# Patient Record
Sex: MALE | Race: WHITE | NOT HISPANIC OR LATINO | Employment: OTHER | URBAN - NONMETROPOLITAN AREA
[De-identification: names, ages, dates, MRNs, and addresses within clinical notes are randomized per-mention and may not be internally consistent; named-entity substitution may affect disease eponyms.]

---

## 2017-01-17 ENCOUNTER — OFFICE VISIT (OUTPATIENT)
Dept: GASTROENTEROLOGY | Facility: CLINIC | Age: 72
End: 2017-01-17

## 2017-01-17 VITALS
TEMPERATURE: 98.1 F | RESPIRATION RATE: 18 BRPM | SYSTOLIC BLOOD PRESSURE: 164 MMHG | WEIGHT: 249 LBS | HEIGHT: 70 IN | HEART RATE: 72 BPM | BODY MASS INDEX: 35.65 KG/M2 | DIASTOLIC BLOOD PRESSURE: 81 MMHG

## 2017-01-17 DIAGNOSIS — D64.9 ANEMIA, UNSPECIFIED TYPE: ICD-10-CM

## 2017-01-17 DIAGNOSIS — Z12.11 COLON CANCER SCREENING: ICD-10-CM

## 2017-01-17 DIAGNOSIS — R19.4 CHANGE IN BOWEL HABITS: Primary | ICD-10-CM

## 2017-01-17 DIAGNOSIS — E66.3 OVER WEIGHT: ICD-10-CM

## 2017-01-17 DIAGNOSIS — K59.09 OTHER CONSTIPATION: ICD-10-CM

## 2017-01-17 PROCEDURE — 99203 OFFICE O/P NEW LOW 30 MIN: CPT | Performed by: INTERNAL MEDICINE

## 2017-01-17 RX ORDER — ASPIRIN 81 MG/1
81 TABLET ORAL DAILY
COMMUNITY
End: 2017-01-17 | Stop reason: DRUGHIGH

## 2017-01-17 RX ORDER — ASPIRIN 325 MG
325 TABLET ORAL DAILY
COMMUNITY
End: 2017-02-15 | Stop reason: HOSPADM

## 2017-01-17 RX ORDER — ATORVASTATIN CALCIUM 20 MG/1
TABLET, FILM COATED ORAL
Refills: 1 | COMMUNITY
Start: 2016-10-29 | End: 2022-08-24 | Stop reason: ALTCHOICE

## 2017-01-17 RX ORDER — AMLODIPINE BESYLATE 5 MG/1
TABLET ORAL
Refills: 1 | COMMUNITY
Start: 2016-10-29 | End: 2022-02-01

## 2017-01-17 RX ORDER — LOSARTAN POTASSIUM AND HYDROCHLOROTHIAZIDE 25; 100 MG/1; MG/1
TABLET ORAL
Refills: 1 | COMMUNITY
Start: 2016-11-10 | End: 2018-04-16

## 2017-01-17 RX ORDER — TRAMADOL HYDROCHLORIDE 50 MG/1
TABLET ORAL
Refills: 2 | COMMUNITY
Start: 2016-12-30 | End: 2018-10-23

## 2017-01-17 NOTE — MR AVS SNAPSHOT
Trevor Jeffers   1/17/2017 1:30 PM   Office Visit    Dept Phone:  [phone]   Encounter #:  [id number]    Provider:  Jovany Monson MD   Department:  [facility] GASTROENTEROLOGY                Your Full Care Plan              Today's Medication Changes          These changes are accurate as of: 1/17/17  2:28 PM.  If you have any questions, ask your nurse or doctor.               Medication(s)that have changed:     aspirin 325 MG tablet   Take 325 mg by mouth Daily.   What changed:  Another medication with the same name was removed. Continue taking this medication, and follow the directions you see here.   Changed by:  Jovany Monson MD                  Your Updated Medication List          This list is accurate as of: 1/17/17  2:28 PM.  Always use your most recent med list.                amLODIPine 5 MG tablet   Commonly known as:  NORVASC       aspirin 325 MG tablet       atorvastatin 20 MG tablet   Commonly known as:  LIPITOR       losartan-hydrochlorothiazide 100-25 MG per tablet   Commonly known as:  HYZAAR       traMADol 50 MG tablet   Commonly known as:  ULTRAM               Instructions    1.  Low-fat diet.  2.  Weight reduction.  3.  Colonoscopy: Description of the procedure, risks benefits alternatives and options including non-operative options were discussed with the patient in detail.  The patient understands and wishes to proceed.       Patient Instructions History      Upcoming Appointments     Visit Type Date Time Department    NEW PATIENT 1/17/2017  1:30 PM [facility]      Dobango Signup     [facility] Dobango allows you to send messages to your doctor, view your test results, renew your prescriptions, schedule appointments, and more. To sign up, go to [employer] and click on the Sign Up Now link in the New User? box. Enter your Dobango Activation Code exactly as it appears below along with the last four digits of your  "Social Security Number and your Date of Birth () to complete the sign-up process. If you do not sign up before the expiration date, you must request a new code.    Badge Activation Code: [email or web address]  Expires: 2017  2:28 PM    If you have questions, you can email [email or web address] or call [phone] to talk to our Badge staff. Remember, Badge is NOT to be used for urgent needs. For medical emergencies, dial 911.               Other Info from Your Visit           Allergies     No Known Allergies      Reason for Visit     Constipation           Vital Signs     Blood Pressure Pulse Temperature Respirations Height Weight    164/81 72 98.1 °F (36.7 °C) 18 70\" (177.8 cm) 249 lb (113 kg)    Body Mass Index Smoking Status                35.73 kg/m2 Never Smoker            "

## 2017-01-17 NOTE — PROGRESS NOTES
[address] [facility] [address]    (H) [phone]  (W)     Chief Complaint   Patient presents with   • Constipation       History of Present Illness  The patient has a history of constipation off and on for the last one year or so.  This is described as new-onset change.  The patient used to be rather irregular in the past.  Severity of constipation is mild to moderate.  This is described as hard stools perhaps one bowel movement 2-3 times a week. The patient takes stool softeners at times to have a bowel movement. There is no associated rectal pain.    There is history of mild anemia noted on routine blood tests.There is no history of overt GI bleed (Hematemesis, melena or hematochezia).  The patient denies nosebleeds.  There is no prior history of anemia.  He denies nausea or vomiting.  There is no reflux.  He denies dysphagia or odynophagia.  There is no history of diarrhea.  There is no family history of colon cancer.  Of interest the patient had undergone a colonoscopy in 2008 in [place].  The patient is not sure if he had some polyps.     Review of Systems   Constitutional: Negative for appetite change, chills, fatigue, fever and unexpected weight change.   HENT: Negative for mouth sores, nosebleeds and trouble swallowing.    Eyes: Negative for discharge and redness.   Respiratory: Negative for apnea, cough and shortness of breath.    Cardiovascular: Negative for chest pain, palpitations and leg swelling.   Gastrointestinal: Positive for constipation. Negative for abdominal distention, abdominal pain, anal bleeding, blood in stool, diarrhea, nausea and vomiting.   Endocrine: Negative for cold intolerance, heat intolerance and polydipsia.   Genitourinary: Negative for dysuria, hematuria and urgency.   Musculoskeletal: Positive for arthralgias. Negative for joint swelling and myalgias.   Skin: Negative for rash.   Allergic/Immunologic: Negative for food allergies and immunocompromised state.  "  Neurological: Negative for dizziness, seizures, syncope and headaches.   Hematological: Negative for adenopathy. Does not bruise/bleed easily.   Psychiatric/Behavioral: Negative for dysphoric mood. The patient is not nervous/anxious and is not hyperactive.      Patient Active Problem List   Diagnosis   • VHD (valvular heart disease)   • Hypertension   • Dyslipidemia   • Obesity   • Degenerative arthritis   • Lower extremity edema     Past Medical History   Diagnosis Date   • Anemia    • Chronic pain    • Degenerative arthritis    • Diabetes    • Dyslipidemia    • Hyperlipidemia    • Hypertension      Long Standing    • Lower extremity edema      Mild, which is self limiting.    • Obesity      s/p lap band surg    • Osteoarthritis    • VHD (valvular heart disease)      Past Surgical History   Procedure Laterality Date   • Laparoscopic gastric banding     • Colonoscopy       Family History   Problem Relation Age of Onset   • Colon cancer Neg Hx      Social History   Substance Use Topics   • Smoking status: Never Smoker   • Smokeless tobacco: Never Used   • Alcohol use No       Current Outpatient Prescriptions:   •  amLODIPine (NORVASC) 5 MG tablet, TAKE 1 TABLET EVERY DAY, Disp: , Rfl: 1  •  aspirin 325 MG tablet, Take 325 mg by mouth Daily., Disp: , Rfl:   •  atorvastatin (LIPITOR) 20 MG tablet, TAKE 1 TABLET EVERY DAY, Disp: , Rfl: 1  •  losartan-hydrochlorothiazide (HYZAAR) 100-25 MG per tablet, TAKE 1 TABLET BY MOUTH EVERY DAY, Disp: , Rfl: 1  •  traMADol (ULTRAM) 50 MG tablet, TAKE ONE TABLET BY MOUTH EVERY 6 HOURS AS NEEDED, Disp: , Rfl: 2  No Known Allergies  Visit Vitals   • /81   • Pulse 72   • Temp 98.1 °F (36.7 °C)   • Resp 18   • Ht 70\" (177.8 cm)   • Wt 249 lb (113 kg)   • BMI 35.73 kg/m2     Physical Exam   Constitutional: He is oriented to person, place, and time. He appears well-developed and well-nourished. No distress.   HENT:   Head: Normocephalic and atraumatic.   Right Ear: Hearing and " external ear normal.   Left Ear: Hearing and external ear normal.   Mouth/Throat: Mucous membranes are normal. No oral lesions. No oropharyngeal exudate, posterior oropharyngeal edema or posterior oropharyngeal erythema.   Eyes: Conjunctivae and EOM are normal. Right eye exhibits no discharge. Left eye exhibits no discharge.   Neck: Trachea normal. Neck supple. No JVD present. No edema present. No thyroid mass present.   Cardiovascular: Normal rate, regular rhythm, S1 normal and S2 normal.  Exam reveals no gallop and no friction rub.    No murmur heard.  Pulmonary/Chest: Effort normal and breath sounds normal. No respiratory distress. He exhibits no tenderness.   Abdominal: Normal appearance and bowel sounds are normal. He exhibits no ascites and no mass. There is no splenomegaly or hepatomegaly. There is no tenderness. There is negative Smalls's sign. No hernia.   Musculoskeletal:        Right shoulder: He exhibits no swelling and no deformity.   Lymphadenopathy:     He has no cervical adenopathy.   Neurological: He is alert and oriented to person, place, and time. He has normal strength. No cranial nerve deficit or sensory deficit.   Skin: No rash noted. No cyanosis. Nails show no clubbing.   Psychiatric: He has a normal mood and affect. His behavior is normal. Judgment and thought content normal. Cognition and memory are normal.     Laboratory Tests:    Upon review of medical records:    Dated November 3, 2016 Sodium 142 potassium 3.5 chloride 105 CO2 30 BUN 18 serum creatinine 0.9 glucose 136.  Calcium 8.9.  Albumin 3.4 T bili 0.50 AST 19 ALT 29 alkaline phosphatase 83. Total cholesterol 166.  Triglycerides 106.  TSH 0.90.  T3 uptake 35.  T4 (thyroxine) 13.4.  WBC 6.67.  Hemoglobin 13.5 hematocrit 39.4 MCV 89.1 Platelet count 239.       Assessment and Plan:      Trevor was seen today for constipation.    Diagnoses and all orders for this visit:    Change in bowel habits  Comments:  Concerns regarding underlying  colonic neoplastic disease.    Other constipation  Comments:  A possibility of obstructive lesion remains.    Anemia, unspecified type  Comments:  Mild anemia.  Possibly related to underlying chronic neoplastic disease.    Colon cancer screening  Comments:  For colon cancer screening.    Over weight  Comments:  The patient is rather overweight.          Discussion:       Plan     Patient Instructions     1.  Low-fat consistent carbohydrate diet.  2.  Weight reduction.  3.  Colonoscopy: Description of the procedure, risks benefits alternatives and options including non-operative options were discussed with the patient in detail.  The patient understands and wishes to proceed.      Patient Care Team:  Brian Del Angel MD as PCP - General    Jovany Monson MD

## 2017-01-17 NOTE — PATIENT INSTRUCTIONS
1.  Low-fat consistent carbohydrate diet.  2.  Weight reduction.  3.  Colonoscopy: Description of the procedure, risks benefits alternatives and options including non-operative options were discussed with the patient in detail.  The patient understands and wishes to proceed.

## 2017-01-17 NOTE — LETTER
January 17, 2017     Brian Del Angel MD  [address]  [place] [address]    Patient: Trevor Jeffers   YOB: 1945   Date of Visit: 1/17/2017       Dear Dr. Bean MD:    Thank you for referring Trevor Jeffers to me for evaluation. Below are the relevant portions of my assessment and plan of care.    If you have questions, please do not hesitate to call me. I look forward to following Trevor along with you.         Sincerely,        Jovany Monson MD        CC: No Recipients  Jovany Monson MD  1/17/2017  5:57 PM  Signed  [address] [facility] [place] [address]    (H) [phone]  (W)     Chief Complaint   Patient presents with   • Constipation       History of Present Illness  The patient has a history of constipation off and on for the last one year or so.  This is described as new-onset change.  The patient used to be rather irregular in the past.  Severity of constipation is mild to moderate.  This is described as hard stools perhaps one bowel movement 2-3 times a week. The patient takes stool softeners at times to have a bowel movement. There is no associated rectal pain.    There is history of mild anemia noted on routine blood tests.There is no history of overt GI bleed (Hematemesis, melena or hematochezia).  The patient denies nosebleeds.  There is no prior history of anemia.  He denies nausea or vomiting.  There is no reflux.  He denies dysphagia or odynophagia.  There is no history of diarrhea.  There is no family history of colon cancer.  Of interest the patient had undergone a colonoscopy in 2008 in [place].  The patient is not sure if he had some polyps.     Review of Systems   Constitutional: Negative for appetite change, chills, fatigue, fever and unexpected weight change.   HENT: Negative for mouth sores, nosebleeds and trouble swallowing.    Eyes: Negative for discharge and redness.   Respiratory: Negative for apnea, cough and shortness of breath.       Cardiovascular: Negative for chest pain, palpitations and leg swelling.   Gastrointestinal: Positive for constipation. Negative for abdominal distention, abdominal pain, anal bleeding, blood in stool, diarrhea, nausea and vomiting.   Endocrine: Negative for cold intolerance, heat intolerance and polydipsia.   Genitourinary: Negative for dysuria, hematuria and urgency.   Musculoskeletal: Positive for arthralgias. Negative for joint swelling and myalgias.   Skin: Negative for rash.   Allergic/Immunologic: Negative for food allergies and immunocompromised state.   Neurological: Negative for dizziness, seizures, syncope and headaches.   Hematological: Negative for adenopathy. Does not bruise/bleed easily.   Psychiatric/Behavioral: Negative for dysphoric mood. The patient is not nervous/anxious and is not hyperactive.      Patient Active Problem List   Diagnosis   • VHD (valvular heart disease)   • Hypertension   • Dyslipidemia   • Obesity   • Degenerative arthritis   • Lower extremity edema     Past Medical History   Diagnosis Date   • Anemia    • Chronic pain    • Degenerative arthritis    • Diabetes    • Dyslipidemia    • Hyperlipidemia    • Hypertension      Long Standing    • Lower extremity edema      Mild, which is self limiting.    • Obesity      s/p lap band surg    • Osteoarthritis    • VHD (valvular heart disease)      Past Surgical History   Procedure Laterality Date   • Laparoscopic gastric banding     • Colonoscopy       Family History   Problem Relation Age of Onset   • Colon cancer Neg Hx      Social History   Substance Use Topics   • Smoking status: Never Smoker   • Smokeless tobacco: Never Used   • Alcohol use No       Current Outpatient Prescriptions:   •  amLODIPine (NORVASC) 5 MG tablet, TAKE 1 TABLET EVERY DAY, Disp: , Rfl: 1  •  aspirin 325 MG tablet, Take 325 mg by mouth Daily., Disp: , Rfl:   •  atorvastatin (LIPITOR) 20 MG tablet, TAKE 1 TABLET EVERY DAY, Disp: , Rfl: 1  •   "losartan-hydrochlorothiazide (HYZAAR) 100-25 MG per tablet, TAKE 1 TABLET BY MOUTH EVERY DAY, Disp: , Rfl: 1  •  traMADol (ULTRAM) 50 MG tablet, TAKE ONE TABLET BY MOUTH EVERY 6 HOURS AS NEEDED, Disp: , Rfl: 2  No Known Allergies  Visit Vitals   • /81   • Pulse 72   • Temp 98.1 °F (36.7 °C)   • Resp 18   • Ht 70\" (177.8 cm)   • Wt 249 lb (113 kg)   • BMI 35.73 kg/m2     Physical Exam   Constitutional: He is oriented to person, place, and time. He appears well-developed and well-nourished. No distress.   HENT:   Head: Normocephalic and atraumatic.   Right Ear: Hearing and external ear normal.   Left Ear: Hearing and external ear normal.   Mouth/Throat: Mucous membranes are normal. No oral lesions. No oropharyngeal exudate, posterior oropharyngeal edema or posterior oropharyngeal erythema.   Eyes: Conjunctivae and EOM are normal. Right eye exhibits no discharge. Left eye exhibits no discharge.   Neck: Trachea normal. Neck supple. No JVD present. No edema present. No thyroid mass present.   Cardiovascular: Normal rate, regular rhythm, S1 normal and S2 normal.  Exam reveals no gallop and no friction rub.    No murmur heard.  Pulmonary/Chest: Effort normal and breath sounds normal. No respiratory distress. He exhibits no tenderness.   Abdominal: Normal appearance and bowel sounds are normal. He exhibits no ascites and no mass. There is no splenomegaly or hepatomegaly. There is no tenderness. There is negative Smalls's sign. No hernia.   Musculoskeletal:        Right shoulder: He exhibits no swelling and no deformity.   Lymphadenopathy:     He has no cervical adenopathy.   Neurological: He is alert and oriented to person, place, and time. He has normal strength. No cranial nerve deficit or sensory deficit.   Skin: No rash noted. No cyanosis. Nails show no clubbing.   Psychiatric: He has a normal mood and affect. His behavior is normal. Judgment and thought content normal. Cognition and memory are normal. "     Laboratory Tests:    Upon review of medical records:    Dated November 3, 2016 Sodium 142 potassium 3.5 chloride 105 CO2 30 BUN 18 serum creatinine 0.9 glucose 136.  Calcium 8.9.  Albumin 3.4 T bili 0.50 AST 19 ALT 29 alkaline phosphatase 83. Total cholesterol 166.  Triglycerides 106.  TSH 0.90.  T3 uptake 35.  T4 (thyroxine) 13.4.  WBC 6.67.  Hemoglobin 13.5 hematocrit 39.4 MCV 89.1 Platelet count 239.       Assessment and Plan:      Trevor was seen today for constipation.    Diagnoses and all orders for this visit:    Change in bowel habits  Comments:  Concerns regarding underlying colonic neoplastic disease.    Other constipation  Comments:  A possibility of obstructive lesion remains.    Anemia, unspecified type  Comments:  Mild anemia.  Possibly related to underlying chronic neoplastic disease.    Colon cancer screening  Comments:  For colon cancer screening.    Over weight  Comments:  The patient is rather overweight.          Discussion:       Plan     Patient Instructions     1.  Low-fat consistent carbohydrate diet.  2.  Weight reduction.  3.  Colonoscopy: Description of the procedure, risks benefits alternatives and options including non-operative options were discussed with the patient in detail.  The patient understands and wishes to proceed.      Patient Care Team:  Brian Del Angel MD as PCP - General    Jovany Monson MD

## 2017-02-14 ENCOUNTER — PREP FOR SURGERY (OUTPATIENT)
Dept: GASTROENTEROLOGY | Facility: CLINIC | Age: 72
End: 2017-02-14

## 2017-02-14 DIAGNOSIS — R19.4 CHANGE IN BOWEL HABITS: Primary | ICD-10-CM

## 2017-02-14 DIAGNOSIS — Z12.11 COLON CANCER SCREENING: ICD-10-CM

## 2017-02-14 DIAGNOSIS — D64.9 ANEMIA, UNSPECIFIED: ICD-10-CM

## 2017-02-14 DIAGNOSIS — K59.09 OTHER CONSTIPATION: ICD-10-CM

## 2017-02-14 RX ORDER — SODIUM CHLORIDE 9 MG/ML
70 INJECTION, SOLUTION INTRAVENOUS CONTINUOUS PRN
Status: CANCELLED | OUTPATIENT
Start: 2017-02-15

## 2017-02-14 RX ORDER — SODIUM CHLORIDE 0.9 % (FLUSH) 0.9 %
1-10 SYRINGE (ML) INJECTION AS NEEDED
Status: CANCELLED | OUTPATIENT
Start: 2017-02-15

## 2017-02-15 ENCOUNTER — ANESTHESIA EVENT (OUTPATIENT)
Dept: GASTROENTEROLOGY | Facility: HOSPITAL | Age: 72
End: 2017-02-15

## 2017-02-15 ENCOUNTER — HOSPITAL ENCOUNTER (OUTPATIENT)
Facility: HOSPITAL | Age: 72
Setting detail: HOSPITAL OUTPATIENT SURGERY
Discharge: HOME | End: 2017-02-15
Attending: INTERNAL MEDICINE | Admitting: INTERNAL MEDICINE

## 2017-02-15 ENCOUNTER — ANESTHESIA (OUTPATIENT)
Dept: GASTROENTEROLOGY | Facility: HOSPITAL | Age: 72
End: 2017-02-15

## 2017-02-15 VITALS
TEMPERATURE: 97.4 F | BODY MASS INDEX: 33.6 KG/M2 | HEIGHT: 71 IN | HEART RATE: 68 BPM | WEIGHT: 240 LBS | RESPIRATION RATE: 16 BRPM | DIASTOLIC BLOOD PRESSURE: 76 MMHG | OXYGEN SATURATION: 97 % | SYSTOLIC BLOOD PRESSURE: 131 MMHG

## 2017-02-15 DIAGNOSIS — D64.9 ANEMIA: ICD-10-CM

## 2017-02-15 DIAGNOSIS — K59.09 OTHER CONSTIPATION: ICD-10-CM

## 2017-02-15 DIAGNOSIS — Z12.11 SCREENING FOR COLON CANCER: ICD-10-CM

## 2017-02-15 DIAGNOSIS — D64.9 ANEMIA, UNSPECIFIED: ICD-10-CM

## 2017-02-15 DIAGNOSIS — Z12.11 COLON CANCER SCREENING: ICD-10-CM

## 2017-02-15 DIAGNOSIS — R19.4 CHANGE IN BOWEL HABITS: ICD-10-CM

## 2017-02-15 DIAGNOSIS — K59.00 CONSTIPATION: ICD-10-CM

## 2017-02-15 LAB — GLUCOSE BLDC GLUCOMTR-MCNC: 146 MG/DL (ref 70–130)

## 2017-02-15 PROCEDURE — 45380 COLONOSCOPY AND BIOPSY: CPT | Performed by: INTERNAL MEDICINE

## 2017-02-15 PROCEDURE — 88305 TISSUE EXAM BY PATHOLOGIST: CPT | Performed by: INTERNAL MEDICINE

## 2017-02-15 PROCEDURE — 82962 GLUCOSE BLOOD TEST: CPT

## 2017-02-15 PROCEDURE — 45385 COLONOSCOPY W/LESION REMOVAL: CPT | Performed by: INTERNAL MEDICINE

## 2017-02-15 PROCEDURE — 25010000002 PROPOFOL 1000 MG/100ML EMULSION: Performed by: NURSE ANESTHETIST, CERTIFIED REGISTERED

## 2017-02-15 PROCEDURE — 25010000002 ONDANSETRON PER 1 MG: Performed by: NURSE ANESTHETIST, CERTIFIED REGISTERED

## 2017-02-15 RX ORDER — ONDANSETRON 2 MG/ML
INJECTION INTRAMUSCULAR; INTRAVENOUS AS NEEDED
Status: DISCONTINUED | OUTPATIENT
Start: 2017-02-15 | End: 2017-02-15 | Stop reason: SURG

## 2017-02-15 RX ORDER — LIDOCAINE 50 MG/G
OINTMENT TOPICAL AS NEEDED
Status: DISCONTINUED | OUTPATIENT
Start: 2017-02-15 | End: 2017-02-15 | Stop reason: HOSPADM

## 2017-02-15 RX ORDER — PROPOFOL 10 MG/ML
INJECTION, EMULSION INTRAVENOUS AS NEEDED
Status: DISCONTINUED | OUTPATIENT
Start: 2017-02-15 | End: 2017-02-15 | Stop reason: SURG

## 2017-02-15 RX ORDER — SODIUM CHLORIDE 0.9 % (FLUSH) 0.9 %
1-10 SYRINGE (ML) INJECTION AS NEEDED
Status: DISCONTINUED | OUTPATIENT
Start: 2017-02-15 | End: 2017-02-15 | Stop reason: HOSPADM

## 2017-02-15 RX ORDER — SODIUM CHLORIDE 9 MG/ML
70 INJECTION, SOLUTION INTRAVENOUS CONTINUOUS PRN
Status: DISCONTINUED | OUTPATIENT
Start: 2017-02-15 | End: 2017-02-15 | Stop reason: HOSPADM

## 2017-02-15 RX ADMIN — SODIUM CHLORIDE 70 ML/HR: 9 INJECTION, SOLUTION INTRAVENOUS at 10:10

## 2017-02-15 RX ADMIN — LIDOCAINE HYDROCHLORIDE 60 MG: 20 INJECTION, SOLUTION INTRAVENOUS at 12:43

## 2017-02-15 RX ADMIN — ONDANSETRON 4 MG: 2 INJECTION INTRAMUSCULAR; INTRAVENOUS at 12:43

## 2017-02-15 RX ADMIN — PROPOFOL 50 MG: 10 INJECTION, EMULSION INTRAVENOUS at 12:57

## 2017-02-15 RX ADMIN — PROPOFOL 100 MG: 10 INJECTION, EMULSION INTRAVENOUS at 12:43

## 2017-02-15 RX ADMIN — PROPOFOL 50 MG: 10 INJECTION, EMULSION INTRAVENOUS at 13:04

## 2017-02-15 RX ADMIN — PROPOFOL 50 MG: 10 INJECTION, EMULSION INTRAVENOUS at 12:50

## 2017-02-15 NOTE — ANESTHESIA POSTPROCEDURE EVALUATION
Patient: Trevor Jeffers    Procedure Summary     Date Anesthesia Start Anesthesia Stop Room / Location    02/15/17 1234 1315 [facility] ENDOSCOPY 2 / [facility] ENDOSCOPY       Procedure Diagnosis Surgeon Provider    COLONOSCOPY (N/A Anus) Diverticulosis of colon; Colon polyps; Internal hemorrhoids MD Jesusita Donovan CRNA          Anesthesia Type: MAC  Last vitals  BP  108/64   Temp   98 skin   Pulse  78   Resp   16   SpO2   98% RA     Post Anesthesia Care and Evaluation    Patient location during evaluation: PHASE II  Patient participation: complete - patient participated  Level of consciousness: awake  Pain score: 0  Pain management: adequate  Airway patency: patent  Anesthetic complications: No anesthetic complications  PONV Status: none  Cardiovascular status: acceptable  Respiratory status: acceptable  Hydration status: acceptable    Comments: Pt alert and oriented, to [facility] for phase 2 recovery on room air

## 2017-02-15 NOTE — ANESTHESIA PREPROCEDURE EVALUATION
Anesthesia Evaluation     no history of anesthetic complications:  NPO Status: > 6 hours   Airway   Mallampati: II  TM distance: <3 FB  possible difficult intubation  Dental      Pulmonary    (+) shortness of breath,   Cardiovascular     (+) hypertension poorly controlled, valvular problems/murmurs ( mumur per pt, unsure of valve disease asymptomatic), SCHWARTZ, murmur, PVD ( le edema),       Neuro/Psych  GI/Hepatic/Renal/Endo    (+) obesity, morbid obesity ( s/p lap band), diabetes mellitus ( fbs 1146) type 2 well controlled,     Musculoskeletal     (+) arthralgias, chronic pain, myalgias,   Abdominal    Substance History      OB/GYN          Other   (+) arthritis     ROS/Med Hx Other: fbs 146                              Anesthesia Plan    ASA 3     MAC     Anesthetic plan and risks discussed with patient.

## 2017-02-15 NOTE — DISCHARGE INSTRUCTIONS
Diet:     Low Fat Diet.     Take fiber supplement.  · Fiber One Cereal-40% Nutty Clusters 1/4 cup daily   · Elvira's All Bran-Bran Buds 1/4 cup daily.  · Use skim, 1, 2 % or soy milk.     Drink 5-6 glasses of water daily.    Blood Thinner Directions:    ·  Avoid Aspirin & other NSAIDS for _7__ days.  Tylenol is okay.    Treatments:    · If constipation persists may use:  · Masterson magnesium caplet (Over-the-counter).  Take one orally at night.  · Cortizone 10- OINTMENT (Hydrocortisone 1% Ointment) Over the counter:   Apply ano-rectally & inside rectum 3 times daily for 1 week. Avoid GEL or Cream.      Other Instructions:    To assist you in voiding:  Drink plenty of fluids  Listen to running water while attempting to void.    If you are unable to urinate and you have an uncomfortable urge to void or it has been   6 hours since you were discharged, return to the Emergency Room    Rest today  No pushing,pulling,tugging,heavy lifting, or strenuous activity   No major decision making,driving,or drinking alcoholic beverages for 24 hours due to the sedation you received  Always use good hand hygiene/washing technique  No driving on pain medications    Call [facility] at [phone] or come to the Emergency   Department if you experience the following: Chest pain, abdominal pain, bleeding  (vomiting of blood or coffee colored material, black stools or mil blood in stools),   fever/chills, nausea and vomiting or dizziness.  Other Instructions:

## 2017-02-15 NOTE — H&P (VIEW-ONLY)
[address] [facility] [address]    (H) [phone]  (W)     Chief Complaint   Patient presents with   • Constipation       History of Present Illness  The patient has a history of constipation off and on for the last one year or so.  This is described as new-onset change.  The patient used to be rather irregular in the past.  Severity of constipation is mild to moderate.  This is described as hard stools perhaps one bowel movement 2-3 times a week. The patient takes stool softeners at times to have a bowel movement. There is no associated rectal pain.    There is history of mild anemia noted on routine blood tests.There is no history of overt GI bleed (Hematemesis, melena or hematochezia).  The patient denies nosebleeds.  There is no prior history of anemia.  He denies nausea or vomiting.  There is no reflux.  He denies dysphagia or odynophagia.  There is no history of diarrhea.  There is no family history of colon cancer.  Of interest the patient had undergone a colonoscopy in 2008 in [place].  The patient is not sure if he had some polyps.     Review of Systems   Constitutional: Negative for appetite change, chills, fatigue, fever and unexpected weight change.   HENT: Negative for mouth sores, nosebleeds and trouble swallowing.    Eyes: Negative for discharge and redness.   Respiratory: Negative for apnea, cough and shortness of breath.    Cardiovascular: Negative for chest pain, palpitations and leg swelling.   Gastrointestinal: Positive for constipation. Negative for abdominal distention, abdominal pain, anal bleeding, blood in stool, diarrhea, nausea and vomiting.   Endocrine: Negative for cold intolerance, heat intolerance and polydipsia.   Genitourinary: Negative for dysuria, hematuria and urgency.   Musculoskeletal: Positive for arthralgias. Negative for joint swelling and myalgias.   Skin: Negative for rash.   Allergic/Immunologic: Negative for food allergies and immunocompromised state.  "  Neurological: Negative for dizziness, seizures, syncope and headaches.   Hematological: Negative for adenopathy. Does not bruise/bleed easily.   Psychiatric/Behavioral: Negative for dysphoric mood. The patient is not nervous/anxious and is not hyperactive.      Patient Active Problem List   Diagnosis   • VHD (valvular heart disease)   • Hypertension   • Dyslipidemia   • Obesity   • Degenerative arthritis   • Lower extremity edema     Past Medical History   Diagnosis Date   • Anemia    • Chronic pain    • Degenerative arthritis    • Diabetes    • Dyslipidemia    • Hyperlipidemia    • Hypertension      Long Standing    • Lower extremity edema      Mild, which is self limiting.    • Obesity      s/p lap band surg    • Osteoarthritis    • VHD (valvular heart disease)      Past Surgical History   Procedure Laterality Date   • Laparoscopic gastric banding     • Colonoscopy       Family History   Problem Relation Age of Onset   • Colon cancer Neg Hx      Social History   Substance Use Topics   • Smoking status: Never Smoker   • Smokeless tobacco: Never Used   • Alcohol use No       Current Outpatient Prescriptions:   •  amLODIPine (NORVASC) 5 MG tablet, TAKE 1 TABLET EVERY DAY, Disp: , Rfl: 1  •  aspirin 325 MG tablet, Take 325 mg by mouth Daily., Disp: , Rfl:   •  atorvastatin (LIPITOR) 20 MG tablet, TAKE 1 TABLET EVERY DAY, Disp: , Rfl: 1  •  losartan-hydrochlorothiazide (HYZAAR) 100-25 MG per tablet, TAKE 1 TABLET BY MOUTH EVERY DAY, Disp: , Rfl: 1  •  traMADol (ULTRAM) 50 MG tablet, TAKE ONE TABLET BY MOUTH EVERY 6 HOURS AS NEEDED, Disp: , Rfl: 2  No Known Allergies  Visit Vitals   • /81   • Pulse 72   • Temp 98.1 °F (36.7 °C)   • Resp 18   • Ht 70\" (177.8 cm)   • Wt 249 lb (113 kg)   • BMI 35.73 kg/m2     Physical Exam   Constitutional: He is oriented to person, place, and time. He appears well-developed and well-nourished. No distress.   HENT:   Head: Normocephalic and atraumatic.   Right Ear: Hearing and " external ear normal.   Left Ear: Hearing and external ear normal.   Mouth/Throat: Mucous membranes are normal. No oral lesions. No oropharyngeal exudate, posterior oropharyngeal edema or posterior oropharyngeal erythema.   Eyes: Conjunctivae and EOM are normal. Right eye exhibits no discharge. Left eye exhibits no discharge.   Neck: Trachea normal. Neck supple. No JVD present. No edema present. No thyroid mass present.   Cardiovascular: Normal rate, regular rhythm, S1 normal and S2 normal.  Exam reveals no gallop and no friction rub.    No murmur heard.  Pulmonary/Chest: Effort normal and breath sounds normal. No respiratory distress. He exhibits no tenderness.   Abdominal: Normal appearance and bowel sounds are normal. He exhibits no ascites and no mass. There is no splenomegaly or hepatomegaly. There is no tenderness. There is negative Smalls's sign. No hernia.   Musculoskeletal:        Right shoulder: He exhibits no swelling and no deformity.   Lymphadenopathy:     He has no cervical adenopathy.   Neurological: He is alert and oriented to person, place, and time. He has normal strength. No cranial nerve deficit or sensory deficit.   Skin: No rash noted. No cyanosis. Nails show no clubbing.   Psychiatric: He has a normal mood and affect. His behavior is normal. Judgment and thought content normal. Cognition and memory are normal.     Laboratory Tests:    Upon review of medical records:    Dated November 3, 2016 Sodium 142 potassium 3.5 chloride 105 CO2 30 BUN 18 serum creatinine 0.9 glucose 136.  Calcium 8.9.  Albumin 3.4 T bili 0.50 AST 19 ALT 29 alkaline phosphatase 83. Total cholesterol 166.  Triglycerides 106.  TSH 0.90.  T3 uptake 35.  T4 (thyroxine) 13.4.  WBC 6.67.  Hemoglobin 13.5 hematocrit 39.4 MCV 89.1 Platelet count 239.       Assessment and Plan:      Trevor was seen today for constipation.    Diagnoses and all orders for this visit:    Change in bowel habits  Comments:  Concerns regarding underlying  colonic neoplastic disease.    Other constipation  Comments:  A possibility of obstructive lesion remains.    Anemia, unspecified type  Comments:  Mild anemia.  Possibly related to underlying chronic neoplastic disease.    Colon cancer screening  Comments:  For colon cancer screening.    Over weight  Comments:  The patient is rather overweight.          Discussion:       Plan     Patient Instructions     1.  Low-fat consistent carbohydrate diet.  2.  Weight reduction.  3.  Colonoscopy: Description of the procedure, risks benefits alternatives and options including non-operative options were discussed with the patient in detail.  The patient understands and wishes to proceed.      Patient Care Team:  Brian Del Angel MD as PCP - General    Jovany Monson MD

## 2017-02-15 NOTE — PLAN OF CARE
Problem: GI Endoscopy (Adult)  Goal: Signs and Symptoms of Listed Potential Problems Will be Absent or Manageable (GI Endoscopy)  Outcome: Ongoing (interventions implemented as appropriate)    02/15/17 1003   GI Endoscopy   Problems Assessed (GI Endoscopy) all   Problems Present (GI Endoscopy) none

## 2017-02-15 NOTE — OP NOTE
PROCEDURE:  Colonoscopy to the terminal ileum with one cold snare and one cold biopsy polypectomies.     DATE OF PROCEDURE: February 15, 2017.     REFERRING PROVIDER:  Brian Del Angel M.D.     INSTRUMENT USED:  Olympus PCF H 190 L videocolonoscope.     INDICATIONS OF THE PROCEDURE:   This is a 71-year-old white male for colon cancer screening.  There is history of constipation, and mild anemia.  Currently undergoing colonoscopy for further evaluation.     BIOPSIES:  1 cold snare polypectomy was performed in the transverse colon, and one cold biopsy polypectomy was performed in the descending colon.      PREPARATION:  [place] prep score: 9 (3+3+3).     PHOTOGRAPHS:  Photographs were included in the medical records.     MEDICATIONS:  MAC.       CONSENT/PREPROCEDURE EVALUATION:  Risks, benefits, alternatives and options of the procedure including risks of sedation/anesthesia were discussed with the patient and informed consent was obtained prior to the procedure.  History and physical examination were performed and nothing precluded the test.      REPORT:  The patient was placed in left lateral decubitus position and a digital examination was performed.  Once under the influence of IV sedation, the instrument was inserted into the rectum and advanced under direct vision to cecum which was identified by the ileocecal valve, triradiate folds and appendiceal orifice. The scope was then maneuvered into the terminal ileum.        FINDINGS:      Digital rectal examination:  Good anal tone.  No perianal pathology.  No mass.  Small external hemorrhoids were noted.      Terminal ileum:  4-5 cm. Normal.        Cecum and ascending colon: Normal.       Hepatic flexure, transverse colon, splenic flexure: A 5 mm sessile polyp was noted in the transverse colon.  This was removed with cold snare.  An occasional diverticulum was noted in the transverse colon.     Descending colon, sigmoid colon and rectum: Scattered diverticulosis was  seen in the left colon.  A 3 mm sessile polyp was noted in the descending colon.  This was removed with cold biopsy forceps.  A retroflex examination within the rectum revealed internal hemorrhoids.        The scope was then straightened, the lower GI tract was decompressed, and the scope was pulled out of the patient.  The patient tolerated the procedure well.  There were no immediate complications and the patient was transferred in stable condition for post procedure observation.       TECHNICAL DATA: [place] prep score: 9 (3+3+3).    Difficulty of examination:  Average.   Withdrawal time: 8 min.  Retroflex examination in right colon: Yes.  Second look Rectum to cecum with decompression: Yes.    DIAGNOSES:    1. Diverticulosis involving the left colon with an occasional diverticulum within the transverse colon.  2. Colon polyps.  3. Internal hemorrhoids, and small external hemorrhoids.  4. This test does not explain the cause of anemia.      RECOMMENDATIONS:     1.  Follow biopsies.    2.  Follow-up: 3-4 weeks.     3.  Followup colonoscopy in 5 years.     4.  Dietary instructions.  5.  Hemorrhoidal care.     Thank you very much for letting me participate in the care of this patient. Please do not hesitate to call me if you have any questions.

## 2017-02-20 LAB
LAB AP CASE REPORT: NORMAL
Lab: NORMAL
PATH REPORT.FINAL DX SPEC: NORMAL

## 2017-03-22 ENCOUNTER — OFFICE VISIT (OUTPATIENT)
Dept: GASTROENTEROLOGY | Facility: CLINIC | Age: 72
End: 2017-03-22

## 2017-03-22 VITALS
WEIGHT: 246 LBS | HEART RATE: 85 BPM | RESPIRATION RATE: 16 BRPM | BODY MASS INDEX: 34.44 KG/M2 | TEMPERATURE: 97.8 F | HEIGHT: 71 IN | SYSTOLIC BLOOD PRESSURE: 136 MMHG | DIASTOLIC BLOOD PRESSURE: 65 MMHG

## 2017-03-22 DIAGNOSIS — K59.00 CONSTIPATION, UNSPECIFIED CONSTIPATION TYPE: Primary | ICD-10-CM

## 2017-03-22 DIAGNOSIS — Z86.2 HISTORY OF ANEMIA: ICD-10-CM

## 2017-03-22 DIAGNOSIS — K63.5 COLON POLYP: ICD-10-CM

## 2017-03-22 DIAGNOSIS — K57.30 DIVERTICULOSIS OF COLON WITHOUT HEMORRHAGE: ICD-10-CM

## 2017-03-22 PROCEDURE — 99214 OFFICE O/P EST MOD 30 MIN: CPT | Performed by: INTERNAL MEDICINE

## 2017-03-22 RX ORDER — ASPIRIN 325 MG
TABLET, DELAYED RELEASE (ENTERIC COATED) ORAL
Refills: 2 | COMMUNITY
Start: 2017-02-08 | End: 2022-08-24 | Stop reason: ALTCHOICE

## 2017-03-22 RX ORDER — CARVEDILOL 3.12 MG/1
TABLET ORAL
Refills: 2 | COMMUNITY
Start: 2017-02-20 | End: 2017-10-31 | Stop reason: SDUPTHER

## 2017-03-22 NOTE — PATIENT INSTRUCTIONS
1. High-fiber-low-fat diet with liberal water intake.  Discussed with the patient in detail.  2. If needed the patient may take magnesium supplement for constipation.  3. It may be prudent to repeat in H&H in the future.  If the patient is anemic possible further investigation that may include an upper endoscopy be considered.  Of interest more recently in November 2016 the patient was normal in terms of H&H.  4. Follow-up colonoscopy in 5 years.  February 2021.  5. Discussed with the patient and his wife in detail.  Opportunity was given to ask questions.  6. The patient may call back regarding progress in 2 weeks    Follow-up:  The patient will call back.

## 2017-03-22 NOTE — PROGRESS NOTES
[address] [facility] [address]    (H) [phone]  (W)     Chief Complaint   Patient presents with   • Follow-up       History of Present Illness     The patient came back for follow visit today.  The patient continues to have significant constipation.  The patient has a history of moderate to severe constipation off and on for the last several years. This is described as hard stools perhaps one bowel movement once or twice a week. There is no associated rectal pain.  Unfortunately, the patient has very little oral fluid intake with a minimal water.  The patient has not followed instructions regarding fiber diet.    The patient denies bright red blood per rectum.  There is no abdominal pain.  He denies nausea or vomiting.  Patient denies reflux.  There is no dysphagia or odynophagia.  The patient had undergone gastric band as a weight reductive procedure.  Although overall the patient has lost weight since then perhaps a total of 30 pounds however since January 2017 the patient after losing 9 pounds, has regained 6 pounds since February 2017.  There is no history of overt GI bleed-hematemesis, melena or hematochezia. Although the patient claims that he has history of anemia.  However, upon review of records his last H&H were normal.  The patient denies liver or pancreatic disease.    More recently dated February 15, 2017 the patient had undergone a colonoscopy to terminal ileum which revealed diverticulosis involving the left colon with an occasional diverticulum within the transverse colon, colon polyps, and internal and small external hemorrhoids.  Biopsies from the colon polyps were negative for adenomatous change.    Review of Systems   Constitutional: Negative for appetite change, chills, fatigue, fever and unexpected weight change.   HENT: Negative for mouth sores, nosebleeds and trouble swallowing.    Eyes: Negative for discharge and redness.   Respiratory: Negative for apnea, cough and shortness  of breath.    Cardiovascular: Negative for chest pain, palpitations and leg swelling.   Gastrointestinal: Positive for constipation. Negative for abdominal distention, abdominal pain, anal bleeding, blood in stool, diarrhea, nausea and vomiting.   Endocrine: Negative for cold intolerance, heat intolerance and polydipsia.   Genitourinary: Negative for dysuria, hematuria and urgency.   Musculoskeletal: Positive for arthralgias. Negative for joint swelling and myalgias.   Skin: Negative for rash.   Allergic/Immunologic: Negative for food allergies and immunocompromised state.   Neurological: Negative for dizziness, seizures, syncope and headaches.   Hematological: Negative for adenopathy (.nka). Does not bruise/bleed easily.   Psychiatric/Behavioral: Negative for dysphoric mood. The patient is not nervous/anxious and is not hyperactive.      Patient Active Problem List   Diagnosis   • VHD (valvular heart disease)   • Hypertension   • Dyslipidemia   • Obesity   • Degenerative arthritis   • Lower extremity edema     Past Medical History:   Diagnosis Date   • Anemia    • Chronic pain    • Degenerative arthritis    • Diabetes     SPOUSE REPORTS SLIGHTLY ELEVATED GLUCOSE AT LAST APPOINTMENT, TAKING NO MEDS   • Dyslipidemia    • Hyperlipidemia    • Hypertension     Long Standing    • Lower extremity edema     Mild, which is self limiting.    • Murmur, cardiac    • Obesity     s/p lap band surg    • Osteoarthritis    • Wears dentures     SPOUSE REPORTS PATIENT WEAR AN UPPER PLATE   • Wears glasses      Past Surgical History:   Procedure Laterality Date   • CATARACT EXTRACTION Bilateral    • COLONOSCOPY     • COLONOSCOPY N/A 2/15/2017    Procedure: COLONOSCOPY;  Surgeon: Jovany Monson MD;  Location: [facility] ENDOSCOPY;  Service:    • ENDOSCOPY     • LAPAROSCOPIC GASTRIC BANDING     • SKIN BIOPSY      SPOUSE REPORTS FROM BACK, BENIGN     Family History   Problem Relation Age of Onset   • Colon cancer Neg Hx      Social History  "  Substance Use Topics   • Smoking status: Never Smoker   • Smokeless tobacco: Never Used   • Alcohol use No       Current Outpatient Prescriptions:   •  amLODIPine (NORVASC) 5 MG tablet, TAKE 1 TABLET EVERY DAY, Disp: , Rfl: 1  •  aspirin  MG tablet, TAKE 1 TABLET BY MOUTH EVERY DAY, Disp: , Rfl: 2  •  atorvastatin (LIPITOR) 20 MG tablet, TAKE 1 TABLET EVERY DAY, Disp: , Rfl: 1  •  carvedilol (COREG) 3.125 MG tablet, TAKE 1 TABLET BY MOUTH WITH FOOD EVERY DAY, Disp: , Rfl: 2  •  losartan-hydrochlorothiazide (HYZAAR) 100-25 MG per tablet, TAKE 1 TABLET BY MOUTH EVERY DAY, Disp: , Rfl: 1  •  traMADol (ULTRAM) 50 MG tablet, TAKE ONE TABLET BY MOUTH EVERY 6 HOURS AS NEEDED, Disp: , Rfl: 2     No Known Allergies     /65  Pulse 85  Temp 97.8 °F (36.6 °C)  Resp 16  Ht 71\" (180.3 cm)  Wt 246 lb (112 kg)  BMI 34.31 kg/m2     Physical Exam   Constitutional: He is oriented to person, place, and time. He appears well-developed and well-nourished. No distress.   HENT:   Head: Normocephalic and atraumatic.   Right Ear: Hearing and external ear normal.   Left Ear: Hearing and external ear normal.   Nose: Nose normal.   Mouth/Throat: Oropharynx is clear and moist and mucous membranes are normal. Mucous membranes are not pale, not dry and not cyanotic. No oral lesions. No oropharyngeal exudate.   Eyes: Conjunctivae and EOM are normal. Right eye exhibits no discharge. Left eye exhibits no discharge. No scleral icterus.   Neck: Trachea normal. Neck supple. No JVD present. No edema present. No thyroid mass and no thyromegaly present.   Cardiovascular: Normal rate, regular rhythm, S2 normal and normal heart sounds.  Exam reveals no gallop, no S3 and no friction rub.    No murmur heard.  Pulmonary/Chest: Effort normal and breath sounds normal. No respiratory distress. He has no wheezes. He has no rales. He exhibits no tenderness.   Abdominal: Soft. Normal appearance and bowel sounds are normal. He exhibits no " distension, no ascites and no mass. There is no splenomegaly or hepatomegaly. There is no tenderness. There is no rigidity, no rebound and no guarding. No hernia.   Musculoskeletal: He exhibits no tenderness or deformity.       Vascular Status -  His exam exhibits no right foot edema. His exam exhibits no left foot edema.  Lymphadenopathy:     He has no cervical adenopathy.        Left: No supraclavicular adenopathy present.   Neurological: He is alert and oriented to person, place, and time. He has normal strength. No cranial nerve deficit or sensory deficit. He exhibits normal muscle tone. Coordination normal.   Skin: No rash noted. He is not diaphoretic. No cyanosis. No pallor. Nails show no clubbing.   Psychiatric: He has a normal mood and affect. His behavior is normal. Judgment and thought content normal.   Nursing note and vitals reviewed.    Upon review of medical records:    Dated November 3, 2016 Sodium 142 potassium 3.5 chloride 105 CO2 30 BUN 18 serum creatinine 0.9 glucose 136. Calcium 8.9. Albumin 3.4 T bili 0.50 AST 19 ALT 29 alkaline phosphatase 83. Total cholesterol 166. Triglycerides 106. TSH 0.90. T3 uptake 35. T4 (thyroxine) 13.4. WBC 6.67. Hemoglobin 13.5 hematocrit 39.4 MCV 89.1 Platelet count 239.    Procedures:  Upon review of medical records:    Dated February 15, 2017 the patient underwent a colonoscopy to the terminal ileum which revealed: Diverticulosis involving left colon within occasional diverticulum within the transverse colon.  Colon polyps.  Internal hemorrhoids, and small external hemorrhoids.  This test does not explain the cause of anemia.  Descending colon polyp, biopsy revealed prominent mucosal fold with lymphoid aggregate.  Negative for adenomatous change or hyperplasia.  Transverse colon polyp, biopsy revealed prominent mucosal fold with lymphoid aggregate.  Negative for adenomatous change or hyperplasia.    Assessment and Plan:      Trevor was seen today for  follow-up.    Diagnoses and all orders for this visit:    Constipation, unspecified constipation type  Comments:  Multifactorial.    Diverticulosis of colon without hemorrhage  Comments:  Stable.  Uncomplicated.    Colon polyp  Comments:  No adenomatous change.    History of anemia  Comments:  More recent H&H in November 2016 were normal.          Discussion:       Plan     Patient Instructions     1. High-fiber-low-fat diet with liberal water intake.  Discussed with the patient in detail.  2. If needed the patient may take magnesium supplement for constipation.  3. It may be prudent to repeat in H&H in the future.  If the patient is anemic possible further investigation that may include an upper endoscopy be considered.  Of interest more recently in November 2016 the patient was normal in terms of H&H.  4. Follow-up colonoscopy in 5 years.  February 2021.  5. Discussed with the patient and his wife in detail.  Opportunity was given to ask questions.  6. The patient may call back regarding progress in 2 weeks    Follow-up:  The patient will call back.      Patient Care Team:  Brian Del Angel MD as PCP - General    Jovany Monson MD

## 2017-10-31 ENCOUNTER — OFFICE VISIT (OUTPATIENT)
Dept: CARDIOLOGY | Facility: CLINIC | Age: 72
End: 2017-10-31

## 2017-10-31 VITALS
HEIGHT: 70 IN | WEIGHT: 247 LBS | HEART RATE: 88 BPM | DIASTOLIC BLOOD PRESSURE: 77 MMHG | BODY MASS INDEX: 35.36 KG/M2 | SYSTOLIC BLOOD PRESSURE: 144 MMHG

## 2017-10-31 DIAGNOSIS — I10 ESSENTIAL HYPERTENSION: ICD-10-CM

## 2017-10-31 DIAGNOSIS — E78.5 DYSLIPIDEMIA: ICD-10-CM

## 2017-10-31 DIAGNOSIS — R07.2 PRECORDIAL PAIN: Primary | ICD-10-CM

## 2017-10-31 DIAGNOSIS — I38 VHD (VALVULAR HEART DISEASE): ICD-10-CM

## 2017-10-31 DIAGNOSIS — R60.0 LOWER EXTREMITY EDEMA: ICD-10-CM

## 2017-10-31 PROCEDURE — 99214 OFFICE O/P EST MOD 30 MIN: CPT | Performed by: INTERNAL MEDICINE

## 2017-10-31 RX ORDER — CARVEDILOL 12.5 MG/1
12.5 TABLET ORAL 2 TIMES DAILY WITH MEALS
Qty: 60 TABLET | Refills: 6 | Status: SHIPPED | OUTPATIENT
Start: 2017-10-31 | End: 2022-08-24 | Stop reason: SDUPTHER

## 2017-10-31 NOTE — PROGRESS NOTES
Encounter Date:10/31/2017      Patient ID: Trevor Jeffers is a 72 y.o. male.    Chief Complaint: vhd     PROBLEM LIST:  1. Valvular heart disease:   a.  Longstanding history of heart murmur.   b. Echocardiogram reviewed by Dr. Sapp in [place] showed aortic sclerosis with moderate aortic stenosis and valve area of 1.1 with peak gradient of 24 mmHg.  Ejection fraction was 70%.  No other significant abnormalities.     c. Cardiolite stress test, January 2015, reviewed by Dr. Sapp at [facility] showed apical thinning, ejection fraction 61%.  No evidence of ischemia.    d. Chest x-ray, 01.21/2015 at [facility] was unremarkable.   2. Longstanding hypertension.  3. Dyslipidemia.   4. Obesity, status post lap band surgery.    5. Degenerative  arthritis.     History of Present Illness  Patient presents today for follow-up with a history of VHD and cardiac risk factors. He has been feeling fine from a cardiac standpoint for the most part except for occasional dyspnea that occurs with exertion and improves with rest, last episode had an associated chest pain that resolved on it own. Wife reminds him that he also has a burning chest pain when he is stressed. Symptom subsides with rest. Denies leg swelling, palpitations, and syncope. Remains busy and active as tolerated. Does not smoke cigarettes. Maintains as healthy a diet as he can.    No Known Allergies      Current Outpatient Prescriptions:   •  amLODIPine (NORVASC) 5 MG tablet, TAKE 1 TABLET EVERY DAY, Disp: , Rfl: 1  •  aspirin  MG tablet, TAKE 1 TABLET BY MOUTH EVERY DAY, Disp: , Rfl: 2  •  atorvastatin (LIPITOR) 20 MG tablet, TAKE 1 TABLET EVERY DAY, Disp: , Rfl: 1  •  carvedilol (COREG) 12.5 MG tablet, Take 1 tablet by mouth 2 (Two) Times a Day With Meals., Disp: 60 tablet, Rfl: 6  •  losartan-hydrochlorothiazide (HYZAAR) 100-25 MG per tablet, TAKE 1 TABLET BY MOUTH EVERY DAY, Disp: , Rfl: 1  •  traMADol (ULTRAM) 50 MG tablet, TAKE ONE  "TABLET BY MOUTH EVERY 6 HOURS AS NEEDED, Disp: , Rfl: 2    The following portions of the patient's history were reviewed and updated as appropriate: allergies, current medications, past family history, past medical history, past social history, past surgical history and problem list.    ROS  Review of Systems   Constitution: Negative for chills, fever, weight gain and weight loss.   Cardiovascular: Negative for chest pain, claudication, dyspnea on exertion, leg swelling, orthopnea, palpitations, paroxysmal nocturnal dyspnea and syncope.        No dizziness   Gastrointestinal: Negative for abdominal pain, constipation, diarrhea, nausea and vomiting.   Genitourinary:        No urinary symptoms   Neurological:        No symptoms of stroke.   All other systems reviewed and are negative.    Objective:     Blood pressure 144/77, pulse 88, height 70\" (177.8 cm), weight 247 lb (112 kg).  Repeat blood pressure measurement by, Franky Reyes MD, at 138/70      Physical Exam   Constitutional: He appears well-developed and well-nourished.   HENT:   HEENT exam unremarkable.   Neck: Neck supple. No JVD present.   No carotid bruits.   Cardiovascular: Normal rate and regular rhythm.    Murmur (2/6 PERRY) heard.  2 plus symmetric pulses.   Pulmonary/Chest: Breath sounds normal. He exhibits no tenderness.   Abdominal:   Abdomen benign.   Musculoskeletal: He exhibits no edema.   Neurological:   Neurological exam unremarkable.   Vitals reviewed.    Lab Review:   Procedures       Assessment:      Diagnosis Plan   1. Precordial pain  Schedule MPS and echocardiogram    2. VHD (valvular heart disease)  This an echocardiogram    3. Lower extremity edema     4. Essential hypertension  Fairly controlled on Coreg and ARB-HCTZ    5. Dyslipidemia  On Lipitor 20 mg      Plan:   Schedule MPS and echocardiogram.  Increase Coreg to 12.5 mg twice daily for angina management.  Continue rest of the current medications.   FU in 3 MO, sooner as " needed.  Thank you for allowing us to participate in the care of your patient.     Franky Reyes MD, [facility], [facility]        Please note that portions of this note may have been completed with a voice recognition program. Efforts were made to edit the dictations, but occasionally words are mistranscribed.

## 2017-11-17 ENCOUNTER — APPOINTMENT (OUTPATIENT)
Dept: CARDIOLOGY | Facility: HOSPITAL | Age: 72
End: 2017-11-17
Attending: INTERNAL MEDICINE

## 2017-11-17 ENCOUNTER — HOSPITAL ENCOUNTER (OUTPATIENT)
Dept: CARDIOLOGY | Facility: HOSPITAL | Age: 72
Discharge: HOME | End: 2017-11-17
Attending: INTERNAL MEDICINE

## 2017-11-21 ENCOUNTER — HOSPITAL ENCOUNTER (OUTPATIENT)
Dept: CARDIOLOGY | Facility: HOSPITAL | Age: 72
Discharge: HOME | End: 2017-11-21
Attending: INTERNAL MEDICINE | Admitting: INTERNAL MEDICINE

## 2017-11-21 ENCOUNTER — HOSPITAL ENCOUNTER (OUTPATIENT)
Dept: CARDIOLOGY | Facility: HOSPITAL | Age: 72
Discharge: HOME | End: 2017-11-21
Attending: INTERNAL MEDICINE

## 2017-11-21 DIAGNOSIS — R60.0 LOWER EXTREMITY EDEMA: ICD-10-CM

## 2017-11-21 DIAGNOSIS — E78.5 DYSLIPIDEMIA: ICD-10-CM

## 2017-11-21 DIAGNOSIS — R07.2 PRECORDIAL PAIN: ICD-10-CM

## 2017-11-21 DIAGNOSIS — I38 VHD (VALVULAR HEART DISEASE): ICD-10-CM

## 2017-11-21 DIAGNOSIS — I10 ESSENTIAL HYPERTENSION: ICD-10-CM

## 2017-11-21 LAB
BH CV ECHO MEAS - AO MAX PG (FULL): 18 MMHG
BH CV ECHO MEAS - AO MAX PG: 23 MMHG
BH CV ECHO MEAS - AO MEAN PG (FULL): 11 MMHG
BH CV ECHO MEAS - AO MEAN PG: 14 MMHG
BH CV ECHO MEAS - AO ROOT AREA (BSA CORRECTED): 1.4
BH CV ECHO MEAS - AO ROOT AREA: 7.5 CM^2
BH CV ECHO MEAS - AO ROOT DIAM: 3.1 CM
BH CV ECHO MEAS - AO V2 MAX: 241 CM/SEC
BH CV ECHO MEAS - AO V2 MEAN: 180 CM/SEC
BH CV ECHO MEAS - AO V2 VTI: 50.5 CM
BH CV ECHO MEAS - AVA(I,A): 1.4 CM^2
BH CV ECHO MEAS - AVA(I,D): 1.4 CM^2
BH CV ECHO MEAS - AVA(V,A): 1.5 CM^2
BH CV ECHO MEAS - AVA(V,D): 1.5 CM^2
BH CV ECHO MEAS - BSA(HAYCOCK): 2.3 M^2
BH CV ECHO MEAS - BSA: 2.2 M^2
BH CV ECHO MEAS - BZI_BMI: 33.9 KILOGRAMS/M^2
BH CV ECHO MEAS - BZI_METRIC_HEIGHT: 177.8 CM
BH CV ECHO MEAS - BZI_METRIC_WEIGHT: 107.1 KG
BH CV ECHO MEAS - CONTRAST EF (2CH): 77.9 ML/M^2
BH CV ECHO MEAS - CONTRAST EF 4CH: 75.4 ML/M^2
BH CV ECHO MEAS - EDV(CUBED): 50.7 ML
BH CV ECHO MEAS - EDV(MOD-SP2): 68 ML
BH CV ECHO MEAS - EDV(MOD-SP4): 57 ML
BH CV ECHO MEAS - EDV(TEICH): 58.1 ML
BH CV ECHO MEAS - EF(CUBED): 87.9 %
BH CV ECHO MEAS - EF(MOD-SP2): 77.9 %
BH CV ECHO MEAS - EF(MOD-SP4): 75.4 %
BH CV ECHO MEAS - EF(TEICH): 82.6 %
BH CV ECHO MEAS - ESV(CUBED): 6.1 ML
BH CV ECHO MEAS - ESV(MOD-SP2): 15 ML
BH CV ECHO MEAS - ESV(MOD-SP4): 14 ML
BH CV ECHO MEAS - ESV(TEICH): 10.1 ML
BH CV ECHO MEAS - FS: 50.5 %
BH CV ECHO MEAS - IVS/LVPW: 1.2
BH CV ECHO MEAS - IVSD: 1 CM
BH CV ECHO MEAS - LA DIMENSION: 5.3 CM
BH CV ECHO MEAS - LA/AO: 1.7
BH CV ECHO MEAS - LAT PEAK E' VEL: 7.6 CM/SEC
BH CV ECHO MEAS - LV DIASTOLIC VOL/BSA (35-75): 25.5 ML/M^2
BH CV ECHO MEAS - LV MASS(C)D: 102.3 GRAMS
BH CV ECHO MEAS - LV MASS(C)DI: 45.7 GRAMS/M^2
BH CV ECHO MEAS - LV MAX PG: 5 MMHG
BH CV ECHO MEAS - LV MEAN PG: 3 MMHG
BH CV ECHO MEAS - LV SYSTOLIC VOL/BSA (12-30): 6.3 ML/M^2
BH CV ECHO MEAS - LV V1 MAX: 112 CM/SEC
BH CV ECHO MEAS - LV V1 MEAN: 74 CM/SEC
BH CV ECHO MEAS - LV V1 VTI: 22.6 CM
BH CV ECHO MEAS - LVIDD: 3.7 CM
BH CV ECHO MEAS - LVIDS: 1.8 CM
BH CV ECHO MEAS - LVLD AP2: 8.5 CM
BH CV ECHO MEAS - LVLD AP4: 8.1 CM
BH CV ECHO MEAS - LVLS AP2: 6.4 CM
BH CV ECHO MEAS - LVLS AP4: 6.1 CM
BH CV ECHO MEAS - LVOT AREA (M): 3.1 CM^2
BH CV ECHO MEAS - LVOT AREA: 3.1 CM^2
BH CV ECHO MEAS - LVOT DIAM: 2 CM
BH CV ECHO MEAS - LVPWD: 0.85 CM
BH CV ECHO MEAS - MED PEAK E' VEL: 5.8 CM/SEC
BH CV ECHO MEAS - MV A MAX VEL: 115 CM/SEC
BH CV ECHO MEAS - MV E MAX VEL: 109 CM/SEC
BH CV ECHO MEAS - MV E/A: 0.95
BH CV ECHO MEAS - PA ACC SLOPE: 883 CM/SEC^2
BH CV ECHO MEAS - PA ACC TIME: 0.1 SEC
BH CV ECHO MEAS - PA PR(ACCEL): 36.3 MMHG
BH CV ECHO MEAS - RAP SYSTOLE: 8 MMHG
BH CV ECHO MEAS - RVSP: 35 MMHG
BH CV ECHO MEAS - SI(AO): 170.2 ML/M^2
BH CV ECHO MEAS - SI(CUBED): 19.9 ML/M^2
BH CV ECHO MEAS - SI(LVOT): 31.7 ML/M^2
BH CV ECHO MEAS - SI(MOD-SP2): 23.7 ML/M^2
BH CV ECHO MEAS - SI(MOD-SP4): 19.2 ML/M^2
BH CV ECHO MEAS - SI(TEICH): 21.4 ML/M^2
BH CV ECHO MEAS - SV(AO): 381.2 ML
BH CV ECHO MEAS - SV(CUBED): 44.5 ML
BH CV ECHO MEAS - SV(LVOT): 71 ML
BH CV ECHO MEAS - SV(MOD-SP2): 53 ML
BH CV ECHO MEAS - SV(MOD-SP4): 43 ML
BH CV ECHO MEAS - SV(TEICH): 48 ML
BH CV ECHO MEAS - TAPSE (>1.6): 1.3 CM2
BH CV ECHO MEAS - TR MAX VEL: 274 CM/SEC
BH CV NUCLEAR PRIOR STUDY: 2
BH CV STRESS BP STAGE 1: NORMAL
BH CV STRESS BP STAGE 3: NORMAL
BH CV STRESS COMMENTS STAGE 1: NORMAL
BH CV STRESS DOSE REGADENOSON STAGE 1: 0.4
BH CV STRESS DURATION MIN STAGE 1: 1
BH CV STRESS DURATION MIN STAGE 2: 1
BH CV STRESS DURATION MIN STAGE 3: 1
BH CV STRESS DURATION MIN STAGE 4: 1
BH CV STRESS DURATION SEC STAGE 1: 0
BH CV STRESS DURATION SEC STAGE 2: 0
BH CV STRESS DURATION SEC STAGE 3: 0
BH CV STRESS DURATION SEC STAGE 4: 0
BH CV STRESS HR STAGE 1: 108
BH CV STRESS HR STAGE 2: 102
BH CV STRESS HR STAGE 3: 93
BH CV STRESS HR STAGE 4: 91
BH CV STRESS O2 STAGE 1: 98
BH CV STRESS O2 STAGE 2: 97
BH CV STRESS O2 STAGE 3: 96
BH CV STRESS O2 STAGE 4: 97
BH CV STRESS PROTOCOL 1: NORMAL
BH CV STRESS RECOVERY BP: NORMAL MMHG
BH CV STRESS RECOVERY HR: 86 BPM
BH CV STRESS RECOVERY O2: 92 %
BH CV STRESS STAGE 1: 1
BH CV STRESS STAGE 2: 2
BH CV STRESS STAGE 3: 3
BH CV STRESS STAGE 4: 4
BH CV VAS BP RIGHT ARM: NORMAL MMHG
BH CV XLRA - RV BASE: 4.1 CM
BH CV XLRA - RV LENGTH: 6.5 CM
BH CV XLRA - RV MID: 3.2 CM
BH CV XLRA - TDI S': 8.6 CM/SEC
LV EF 2D ECHO EST: 60 %
LV EF NUC BP: 73 %
MAXIMAL PREDICTED HEART RATE: 150 BPM
MAXIMAL PREDICTED HEART RATE: 150 BPM
PERCENT MAX PREDICTED HR: 71.33 %
STRESS BASELINE BP: NORMAL MMHG
STRESS BASELINE HR: 79 BPM
STRESS O2 SAT REST: 93 %
STRESS PERCENT HR: 84 %
STRESS POST ESTIMATED WORKLOAD: 1 METS
STRESS POST EXERCISE DUR MIN: 4 MIN
STRESS POST EXERCISE DUR SEC: 0 SEC
STRESS POST O2 SAT PEAK: 97 %
STRESS POST PEAK BP: NORMAL MMHG
STRESS POST PEAK HR: 107 BPM
STRESS TARGET HR: 128 BPM
STRESS TARGET HR: 128 BPM

## 2017-11-21 PROCEDURE — 78492 MYOCRD IMG PET MLT RST&STRS: CPT | Performed by: INTERNAL MEDICINE

## 2017-11-21 PROCEDURE — 78492 MYOCRD IMG PET MLT RST&STRS: CPT

## 2017-11-21 PROCEDURE — 0 RUBIDIUM CHLORIDE: Performed by: INTERNAL MEDICINE

## 2017-11-21 PROCEDURE — 93018 CV STRESS TEST I&R ONLY: CPT | Performed by: INTERNAL MEDICINE

## 2017-11-21 PROCEDURE — 93306 TTE W/DOPPLER COMPLETE: CPT | Performed by: INTERNAL MEDICINE

## 2017-11-21 PROCEDURE — 93017 CV STRESS TEST TRACING ONLY: CPT

## 2017-11-21 PROCEDURE — 25010000002 REGADENOSON 0.4 MG/5ML SOLUTION: Performed by: INTERNAL MEDICINE

## 2017-11-21 PROCEDURE — A9555 RB82 RUBIDIUM: HCPCS | Performed by: INTERNAL MEDICINE

## 2017-11-21 PROCEDURE — 93306 TTE W/DOPPLER COMPLETE: CPT

## 2017-11-21 RX ADMIN — RUBIDIUM CHLORIDE RB-82 1 DOSE: 150 INJECTION, SOLUTION INTRAVENOUS at 13:15

## 2017-11-21 RX ADMIN — REGADENOSON 0.4 MG: 0.08 INJECTION, SOLUTION INTRAVENOUS at 13:24

## 2017-11-21 RX ADMIN — RUBIDIUM CHLORIDE RB-82 1 DOSE: 150 INJECTION, SOLUTION INTRAVENOUS at 13:25

## 2018-04-16 ENCOUNTER — OFFICE VISIT (OUTPATIENT)
Dept: CARDIOLOGY | Facility: CLINIC | Age: 73
End: 2018-04-16

## 2018-04-16 VITALS
HEIGHT: 70 IN | BODY MASS INDEX: 34.07 KG/M2 | HEART RATE: 70 BPM | SYSTOLIC BLOOD PRESSURE: 118 MMHG | DIASTOLIC BLOOD PRESSURE: 66 MMHG | WEIGHT: 238 LBS

## 2018-04-16 DIAGNOSIS — I10 ESSENTIAL HYPERTENSION: ICD-10-CM

## 2018-04-16 DIAGNOSIS — E78.5 DYSLIPIDEMIA: ICD-10-CM

## 2018-04-16 DIAGNOSIS — I38 VHD (VALVULAR HEART DISEASE): Primary | ICD-10-CM

## 2018-04-16 PROCEDURE — 99213 OFFICE O/P EST LOW 20 MIN: CPT | Performed by: INTERNAL MEDICINE

## 2018-04-16 PROCEDURE — 93000 ELECTROCARDIOGRAM COMPLETE: CPT | Performed by: INTERNAL MEDICINE

## 2018-04-16 RX ORDER — ACETAMINOPHEN AND CODEINE PHOSPHATE 300; 30 MG/1; MG/1
1 TABLET ORAL DAILY
COMMUNITY
Start: 2018-03-14 | End: 2018-10-23

## 2018-04-16 NOTE — PROGRESS NOTES
Encounter Date:04/16/2018      Patient ID: Trevor Jeffers is a 72 y.o. male.    Chief Complaint: Hypertension      PROBLEM LIST:  1. Valvular heart disease:   a.  Longstanding history of heart murmur.   b. Echocardiogram reviewed by Dr. Sapp in [place] showed aortic sclerosis with moderate aortic stenosis and valve area of 1.1 with peak gradient of 24 mmHg.  Ejection fraction was 70%.  No other significant abnormalities.     c. Cardiolite stress test, January 2015, reviewed by Dr. Sapp at [facility] showed apical thinning, ejection fraction 61%.  No evidence of ischemia.    d. Chest x-ray, 01.21/2015 at [facility] was unremarkable.   e. Stress test 11/21/2017: Chemical stress test negative for ischemic ST segment changes. Brief chest discomfort was reported. Myocardial perfusion imaging indicates a normal myocardial perfusion study with no evidence of ischemia. Impressions are consistent with a low risk study. Normal left ventricular systolic function, ejection fraction 73%.  f. Echo 11/21/2017: EF 60%. Left atrial cavity size is moderately dilated. Left ventricular diastolic dysfunction (grade I) consistent with impaired relaxation. Mild aortic valve stenosis is present. Mean gradient 14 mmHg. Mild aortic valve regurgitation.  Mild MR.  Mild TR.  2. Longstanding hypertension.  3. Dyslipidemia.   4. Obesity, status post lap band surgery.    5. Degenerative  arthritis.     History of Present Illness  Patient presents today for follow-up with a history of valvular heart disease and cardiac risk factors. Since last visit has been doing well from a cardiac standpoint. Is scheduled for a knee replacement and needs cardiac clearance. His blood sugar averages around 140 or less, cholesterol levels are monitored by PCP and have been optimal according to the patient.  Denies chest pain, shortness of breath, leg swelling, palpitations, and syncope. Remains busy and active but limited due to knee  "pain.      No Known Allergies      Current Outpatient Prescriptions:   •  acetaminophen-codeine (TYLENOL #3) 300-30 MG per tablet, Take 1 tablet by mouth Daily., Disp: , Rfl:   •  amLODIPine (NORVASC) 5 MG tablet, TAKE 1 TABLET EVERY DAY, Disp: , Rfl: 1  •  aspirin  MG tablet, TAKE 1 TABLET BY MOUTH EVERY DAY, Disp: , Rfl: 2  •  atorvastatin (LIPITOR) 20 MG tablet, TAKE 1 TABLET EVERY DAY, Disp: , Rfl: 1  •  carvedilol (COREG) 12.5 MG tablet, Take 1 tablet by mouth 2 (Two) Times a Day With Meals., Disp: 60 tablet, Rfl: 6  •  metFORMIN (GLUCOPHAGE) 500 MG tablet, Take 500 mg by mouth Daily., Disp: , Rfl:   •  traMADol (ULTRAM) 50 MG tablet, TAKE ONE TABLET BY MOUTH EVERY 6 HOURS AS NEEDED, Disp: , Rfl: 2    The following portions of the patient's history were reviewed and updated as appropriate: allergies, current medications, past family history, past medical history, past social history, past surgical history and problem list.    ROS  Review of Systems   Constitution: Negative for chills, fever, weight gain and weight loss.   Cardiovascular: Negative for chest pain, claudication, dyspnea on exertion, leg swelling, orthopnea, palpitations, paroxysmal nocturnal dyspnea and syncope.        No dizziness   Gastrointestinal: Negative for abdominal pain, constipation, diarrhea, nausea and vomiting.   Genitourinary:        No urinary symptoms   Neurological:        No symptoms of stroke.   All other systems reviewed and are negative.    Objective:     Blood pressure 118/66, pulse 70, height 177.8 cm (70\"), weight 108 kg (238 lb).      Physical Exam  Constitutional: She appears well-developed and well-nourished.   HENT:   HEENT exam unremarkable.   Neck: Neck supple. No JVD present.   No carotid bruits.   Cardiovascular: Normal rate, regular rhythm and normal heart sounds.    No murmur heard.  2 plus symmetric pulses.   Pulmonary/Chest: Breath sounds normal. Does not exhibit tenderness.   Abdominal:   Abdomen benign. "   Musculoskeletal: Does not exhibit edema.   Neurological:   Neurological exam unremarkable.   Vitals reviewed.    Lab Review:     ECG 12 Lead  Date/Time: 4/16/2018 10:16 AM  Performed by: AARON REYES  Authorized by: AARON REYES   Comparison: not compared with previous ECG   Previous ECG: no previous ECG available  Rhythm: sinus rhythm  Clinical impression: non-specific ECG  Comments: Nonspecific ST changes with inferior and lateral T-wave inversions/flattening.               Assessment:      Diagnosis Plan   1. VHD (valvular heart disease)  Stable.     2. Essential hypertension  Well-controlled current medication regimen.     3. Dyslipidemia.  LDL goal less than 70.   Continue atorvastatin (Lipitor) 20 mg      Plan:   His EKG is chronically abnormal however he had unremarkable cardiac evaluation in November 2017 including a stress test and echocardiogram.  His currently free of any symptoms of angina or CHF and is on appropriate medical management to address his cardiac risk factors.  He is cleared for surgery with acceptable risk.  Continue current medications.   FU in 6 MO at [facility], sooner as needed.  Thank you for allowing us to participate in the care of your patient.     Scribed for Aaron Reyes MD by Pedro Mueller. 4/16/2018  10:16 AM    IAaron MD, personally performed the services described in this documentation as scribed by the above named individual in my presence, and it is both accurate and complete.  4/16/2018  10:17 AM        Please note that portions of this note may have been completed with a voice recognition program. Efforts were made to edit the dictations, but occasionally words are mistranscribed.

## 2018-10-23 ENCOUNTER — OFFICE VISIT (OUTPATIENT)
Dept: CARDIOLOGY | Facility: CLINIC | Age: 73
End: 2018-10-23

## 2018-10-23 VITALS
SYSTOLIC BLOOD PRESSURE: 108 MMHG | HEART RATE: 70 BPM | HEIGHT: 70 IN | BODY MASS INDEX: 34.79 KG/M2 | WEIGHT: 243 LBS | DIASTOLIC BLOOD PRESSURE: 62 MMHG

## 2018-10-23 DIAGNOSIS — I38 VHD (VALVULAR HEART DISEASE): Primary | ICD-10-CM

## 2018-10-23 DIAGNOSIS — E78.5 DYSLIPIDEMIA: ICD-10-CM

## 2018-10-23 DIAGNOSIS — I10 ESSENTIAL HYPERTENSION: ICD-10-CM

## 2018-10-23 PROCEDURE — 99213 OFFICE O/P EST LOW 20 MIN: CPT | Performed by: INTERNAL MEDICINE

## 2018-10-23 RX ORDER — GLIPIZIDE 2.5 MG/1
2.5 TABLET, EXTENDED RELEASE ORAL DAILY
COMMUNITY
Start: 2018-10-18 | End: 2022-02-01

## 2018-10-23 RX ORDER — LOSARTAN POTASSIUM AND HYDROCHLOROTHIAZIDE 25; 100 MG/1; MG/1
0.5 TABLET ORAL DAILY
COMMUNITY
End: 2022-08-24 | Stop reason: ALTCHOICE

## 2018-10-23 NOTE — PROGRESS NOTES
Encounter Date:10/23/2018      Patient ID: Trevor Jeffers is a 73 y.o. male.    Chief Complaint: Precordial pain      PROBLEM LIST:  1. Valvular Heart Disease  a. Longstanding history of heart murmur.  b. Echocardiogram reviewed by Dr. Sapp in [place] showed aortic sclerosis with moderate aortic stenosis and valve area of 1.1 with peak gradient of 24 mmHg. LVEF 70%. No other significant abnormalities.   c. Cardiolite stress test, January 2015, reviewed by Dr. Sapp at [facility] showed apical thinning, ejection fraction 61%.  No evidence of ischemia.    d. Chest x-ray, 01.21/2015 at [facility] was unremarkable.   e. Stress test 11/21/2017: Chemical stress test negative for ischemic ST segment changes. Brief chest discomfort was reported. Myocardial perfusion imaging indicates a normal myocardial perfusion study with no evidence of ischemia. Impressions are consistent with a low risk study. Normal left ventricular systolic function, ejection fraction 73%.  f. Echo 11/21/2017: EF 60%. Left atrial cavity size is moderately dilated. Left ventricular diastolic dysfunction (grade I) consistent with impaired relaxation. Mild aortic valve stenosis is present. Mean gradient 14 mmHg. Mild aortic valve regurgitation.  Mild MR.  Mild TR.  2. Longstanding hypertension.  3. Dyslipidemia.   4. Obesity, status post lap band surgery.    5. Degenerative arthritis.     History of Present Illness  Patient presents today for follow-up with a history of valvular heart disease and cardiac risk factors. He is doing well from a cardiovascular standpoint. He was recently treated for a kidney stones at [facility], had experienced some hematuria which may have contribution to this mild anemia also his renal function has been mildly impaired..Denies chest pain, shortness of breath, leg swelling, palpitations, and syncope. Remains busy and active, without limitations. He admits that he does not exercise but  "plans to increase his activities..     No Known Allergies      Current Outpatient Prescriptions:   •  amLODIPine (NORVASC) 5 MG tablet, TAKE 1 TABLET EVERY DAY, Disp: , Rfl: 1  •  aspirin  MG tablet, TAKE 1 TABLET BY MOUTH EVERY DAY, Disp: , Rfl: 2  •  atorvastatin (LIPITOR) 20 MG tablet, TAKE 1 TABLET EVERY DAY, Disp: , Rfl: 1  •  carvedilol (COREG) 12.5 MG tablet, Take 1 tablet by mouth 2 (Two) Times a Day With Meals., Disp: 60 tablet, Rfl: 6  •  glipiZIDE (GLUCOTROL XL) 2.5 MG 24 hr tablet, Take 2.5 mg by mouth Daily., Disp: , Rfl:   •  losartan-hydrochlorothiazide (HYZAAR) 100-25 MG per tablet, Take 1 tablet by mouth Daily., Disp: , Rfl:     The following portions of the patient's history were reviewed and updated as appropriate: allergies, current medications, past family history, past medical history, past social history, past surgical history and problem list.    ROS  Review of Systems   Constitution: Negative for chills, fever, weight gain and weight loss.   Cardiovascular: Negative for chest pain, claudication, dyspnea on exertion, leg swelling, orthopnea, palpitations, paroxysmal nocturnal dyspnea and syncope.        No dizziness   Gastrointestinal: Negative for abdominal pain, constipation, diarrhea, nausea and vomiting.   Genitourinary:        No urinary symptoms   Neurological:        No symptoms of stroke.   All other systems reviewed and are negative.    Objective:     Blood pressure 108/62, pulse 70, height 177.8 cm (70\"), weight 110 kg (243 lb).      Physical Exam  Constitutional: She appears well-developed and well-nourished.   HENT:   HEENT exam unremarkable.   Neck: Neck supple. No JVD present.   No carotid bruits.   Cardiovascular: Normal rate, regular rhythm and normal heart sounds.    No murmur heard.  2 plus symmetric pulses.   Pulmonary/Chest: Breath sounds normal. Does not exhibit tenderness.   Abdominal:   Abdomen benign.   Musculoskeletal: Does not exhibit edema.   Neurological: "   Neurological exam unremarkable.   Vitals reviewed.    Lab Review:     Procedures   Lipid Panel (10/2/2018)      HDL 46        CREAT 1.5  Normal LFD      Assessment:      Diagnosis Plan   1. VHD (valvular heart disease)  Asymptomatic, stable, continue current medications   2. Essential hypertension  well-controlled with medication   3. Dyslipidemia  Managed by PCP, continue statin therapy with atorvastatin.      Plan:   Stable cardiac status.  Continue current medications.   Increase activities, exercise regularly.  Follow-up with PCP regarding renal dysfunction and management of dyslipidemia.  FU in 12 MO, sooner as needed.  Thank you for allowing us to participate in the care of your patient.     Scribed for Franky Reyes MD by Amy Medina. 10/23/2018  1:40 PM      I, Franky Reyes MD, personally performed the services described in this documentation as scribed by the above named individual in my presence, and it is both accurate and complete.  10/23/2018  4:17 PM        Please note that portions of this note may have been completed with a voice recognition program. Efforts were made to edit the dictations, but occasionally words are mistranscribed.

## 2019-11-26 ENCOUNTER — OFFICE VISIT (OUTPATIENT)
Dept: NEUROSURGERY | Facility: CLINIC | Age: 74
End: 2019-11-26

## 2019-11-26 DIAGNOSIS — M51.36 BULGING LUMBAR DISC: Primary | ICD-10-CM

## 2019-11-26 PROCEDURE — 99203 OFFICE O/P NEW LOW 30 MIN: CPT | Performed by: NEUROLOGICAL SURGERY

## 2019-11-26 NOTE — PROGRESS NOTES
Subjective   Patient ID: Trevor Jeffers is a 74 y.o. male is being seen for consultation today at the request of Brian Del Angel, *  Chief Complaint: Left-sided back pain    History of Present Illness: The patient is a 74-year-old man from [facility] with a history of pain in the left lumbar region that tends to radiate to his left lower abdomen or from his abdomen on the left side to his lower back.  Pain is been present for about 2 years it is worse with standing or walking and is aggravated by prolonged standing.  Is better when he sits or lies down.  He has to do the cooking at home and is not able to stand longer than about 10 minutes.    Review of Radiographic Studies:  Lumbar MRI scan dated 10/4/2019 shows degenerative disc changes throughout the lumbar spine, with a right-sided chronic disc bulge at L3-4 with facet hypertrophy, and  opposite to the side of his pain complaint.    The following portions of the patient's history were reviewed, updated as appropriate and approved: allergies, current medications, past family history, past medical history, past social history, past surgical history, review of systems and problem list.    Review of Systems   Constitutional: Negative for activity change, appetite change, chills, diaphoresis, fatigue, fever and unexpected weight change.   HENT: Negative for congestion, dental problem, drooling, ear discharge, ear pain, facial swelling, hearing loss, mouth sores, nosebleeds, postnasal drip, rhinorrhea, sinus pressure, sneezing, sore throat, tinnitus, trouble swallowing and voice change.    Eyes: Negative for photophobia, pain, discharge, redness, itching and visual disturbance.   Respiratory: Negative for apnea, cough, choking, chest tightness, shortness of breath, wheezing and stridor.    Cardiovascular: Negative for chest pain, palpitations and leg swelling.   Gastrointestinal: Negative for abdominal distention, abdominal pain, anal bleeding, blood in  stool, constipation, diarrhea, nausea, rectal pain and vomiting.   Endocrine: Negative for cold intolerance, heat intolerance, polydipsia, polyphagia and polyuria.   Genitourinary: Negative for decreased urine volume, difficulty urinating, dysuria, enuresis, flank pain, frequency, genital sores, hematuria and urgency.   Musculoskeletal: Positive for arthralgias, back pain, neck pain and neck stiffness. Negative for gait problem, joint swelling and myalgias.   Skin: Negative for color change, pallor, rash and wound.   Allergic/Immunologic: Negative for environmental allergies, food allergies and immunocompromised state.   Neurological: Positive for dizziness, weakness and light-headedness. Negative for tremors, seizures, syncope, facial asymmetry, speech difficulty, numbness and headaches.   Hematological: Negative for adenopathy. Does not bruise/bleed easily.   Psychiatric/Behavioral: Negative for agitation, behavioral problems, confusion, decreased concentration, dysphoric mood, hallucinations, self-injury, sleep disturbance and suicidal ideas. The patient is not nervous/anxious and is not hyperactive.        Objective     NEUROLOGICAL EXAMINATION:      MENTAL STATUS:  Alert and oriented.  Speech intact.  Recent and remote memory intact.      CRANIAL NERVES:  Cranial nerve II:  Visual fields are full.  Cranial nerves III, IV and VI:  PERRLADC.  Extraocular movements are intact.  Nystagmus is not present.  Cranial nerve V:  Facial sensation is intact.  Cranial nerve VII:  Muscles of facial expression reveal no asymmetry.  Cranial nerve VIII:  Hearing is intact.  Cranial nerves IX and X:  Palate elevates symmetrically.  Cranial nerve XI:  Shoulder shrug is intact.  Cranial nerve XII:  Tongue is midline without evidence of atrophy or fasciculation.    MUSCULOSKELETAL:  SLR negative bilaterally.  Hip rotation negative for pain bilaterally.    MOTOR: Strength intact in hip flexion, knee extension, ankle dorsiflexion  plantarflexion.    SENSATION: No sensory loss over the lower extremities.    REFLEXES:  DTR absent both knees and both ankles.      Assessment   Chronic left lumbar and flank pain with abdominal radiation in the left lower quadrant.  No evidence of lumbar radiculopathy.  Degenerative disc changes throughout the lumbar spine which could potentially account for left-sided lumbar pain.       Plan   Recommend referral to pain management for treatment of the pain source since there is no surgical treatment that I could recommend to directly alleviate the pain complaints.       Bruce Nicole MD

## 2022-02-01 ENCOUNTER — OFFICE VISIT (OUTPATIENT)
Dept: CARDIOLOGY | Facility: CLINIC | Age: 77
End: 2022-02-01

## 2022-02-01 VITALS
HEART RATE: 62 BPM | WEIGHT: 231 LBS | DIASTOLIC BLOOD PRESSURE: 77 MMHG | BODY MASS INDEX: 33.07 KG/M2 | OXYGEN SATURATION: 95 % | HEIGHT: 70 IN | SYSTOLIC BLOOD PRESSURE: 128 MMHG

## 2022-02-01 DIAGNOSIS — E78.5 DYSLIPIDEMIA: ICD-10-CM

## 2022-02-01 DIAGNOSIS — I10 PRIMARY HYPERTENSION: ICD-10-CM

## 2022-02-01 DIAGNOSIS — I20.8 CHRONIC STABLE ANGINA: ICD-10-CM

## 2022-02-01 DIAGNOSIS — I38 VHD (VALVULAR HEART DISEASE): Primary | ICD-10-CM

## 2022-02-01 PROBLEM — I20.89 CHRONIC STABLE ANGINA: Status: ACTIVE | Noted: 2022-02-01

## 2022-02-01 PROCEDURE — 99204 OFFICE O/P NEW MOD 45 MIN: CPT

## 2022-02-01 PROCEDURE — 93000 ELECTROCARDIOGRAM COMPLETE: CPT

## 2022-02-01 RX ORDER — GABAPENTIN 300 MG/1
1 CAPSULE ORAL DAILY
COMMUNITY
End: 2022-08-24 | Stop reason: ALTCHOICE

## 2022-02-01 RX ORDER — TRAMADOL HYDROCHLORIDE 50 MG/1
50 TABLET ORAL EVERY 6 HOURS PRN
COMMUNITY

## 2022-02-09 ENCOUNTER — OFFICE VISIT (OUTPATIENT)
Dept: NEUROSURGERY | Facility: CLINIC | Age: 77
End: 2022-02-09

## 2022-02-09 VITALS — TEMPERATURE: 97.6 F | HEIGHT: 70 IN | WEIGHT: 233 LBS | BODY MASS INDEX: 33.36 KG/M2

## 2022-02-09 DIAGNOSIS — M51.36 DDD (DEGENERATIVE DISC DISEASE), LUMBAR: ICD-10-CM

## 2022-02-09 DIAGNOSIS — M54.9 MECHANICAL BACK PAIN: Primary | ICD-10-CM

## 2022-02-09 DIAGNOSIS — M47.819 FACET ARTHROPATHY: ICD-10-CM

## 2022-02-09 PROCEDURE — 99214 OFFICE O/P EST MOD 30 MIN: CPT | Performed by: NEUROLOGICAL SURGERY

## 2022-02-09 RX ORDER — LIDOCAINE 50 MG/G
1 PATCH TOPICAL EVERY 24 HOURS
Qty: 30 PATCH | Refills: 0 | Status: SHIPPED | OUTPATIENT
Start: 2022-02-09 | End: 2022-08-24 | Stop reason: ALTCHOICE

## 2022-02-09 NOTE — PROGRESS NOTES
Patient: Trevor Jeffers  : 1945    Primary Care Provider: Brian Del Angel MD    Requesting Provider: As above        History    Chief Complaint: Left lower back pain.    History of Present Illness: Mr. Jeffers is a 76-year-old gentleman who saw my former colleague Dr. Nicole in 2019 with the exact same complaints.  He uncommonly has some pain in his left leg.  Occasionally has some pain in his upper left shoulder blade.  The pain in his left lower back is burning and it tends to come and go.  Some days he does well.  Some days he does not do well.  He is worse with protracted walking.  Sitting is helpful when the symptoms are present.  He has no lower extremity weakness or numbness.  He denies bowel or bladder dysfunction.  Dr. Nicole and referred him to pain management.  Injections were not helpful.    Review of Systems   Constitutional: Negative for activity change, appetite change, chills, diaphoresis, fatigue, fever and unexpected weight change.   HENT: Negative for congestion, dental problem, drooling, ear discharge, ear pain, facial swelling, hearing loss, mouth sores, nosebleeds, postnasal drip, rhinorrhea, sinus pressure, sinus pain, sneezing, sore throat, tinnitus, trouble swallowing and voice change.    Eyes: Negative for photophobia, pain, discharge, redness, itching and visual disturbance.   Respiratory: Negative for apnea, cough, choking, chest tightness, shortness of breath, wheezing and stridor.    Cardiovascular: Negative for chest pain, palpitations and leg swelling.   Gastrointestinal: Negative for abdominal distention, abdominal pain, anal bleeding, blood in stool, constipation, diarrhea, nausea, rectal pain and vomiting.   Endocrine: Negative for cold intolerance, heat intolerance, polydipsia, polyphagia and polyuria.   Genitourinary: Negative for decreased urine volume, difficulty urinating, dysuria, enuresis, flank pain, frequency, genital sores, hematuria, penile discharge,  "penile pain, penile swelling, scrotal swelling, testicular pain and urgency.   Musculoskeletal: Positive for back pain, myalgias and neck pain. Negative for arthralgias, gait problem, joint swelling and neck stiffness.   Skin: Negative for color change, pallor, rash and wound.   Allergic/Immunologic: Negative for environmental allergies, food allergies and immunocompromised state.   Neurological: Negative for dizziness, tremors, seizures, syncope, facial asymmetry, speech difficulty, weakness, light-headedness, numbness and headaches.   Hematological: Negative for adenopathy. Does not bruise/bleed easily.   Psychiatric/Behavioral: Negative for agitation, behavioral problems, confusion, decreased concentration, dysphoric mood, hallucinations, self-injury, sleep disturbance and suicidal ideas. The patient is not nervous/anxious and is not hyperactive.        The patient's past medical history, past surgical history, family history, and social history have been reviewed at length in the electronic medical record.    Physical Exam:   Temp 97.6 °F (36.4 °C)   Ht 177.8 cm (70\")   Wt 106 kg (233 lb)   BMI 33.43 kg/m²   CONSTITUTIONAL: Patient is well-nourished, pleasant and appears stated age.  MUSCULOSKELETAL:  Straight leg raising is negative.  Luis's Sign is negative.  ROM in the low back is normal.  Tenderness in the back to palpation is not observed.  NEUROLOGICAL:  Orientation, memory, attention span, language function, and cognition have been examined and are intact.  Strength is intact in the lower extremities to direct testing.  Muscle tone is normal throughout.  Station and gait are normal.  Sensation is intact to light touch testing throughout.  Deep tendon reflexes are 1+ and symmetrical.  Coordination is intact.      Medical Decision Making    Data Review:   (All imaging studies were personally reviewed unless stated otherwise)  MRI of the lumbar spine dated 12/10/2021 demonstrates diffuse degenerative " disc disease and some facet arthropathy.  There is a synovial cyst on the right side at L3-4.  There is no high-grade root or canal compromise.    Diagnosis:   The patient has some mechanical left lower back pain.    Treatment Options:   There is no role for neurosurgical intervention in this setting and treatment will need to remain symptomatic.  I have prescribed some Lidoderm patches to see if that would help with his symptoms.  He will continue his tramadol as needed.  He will follow-up on an as-needed basis.       Diagnosis Plan   1. Mechanical back pain     2. DDD (degenerative disc disease), lumbar     3. Facet arthropathy         Scribed for Joey Trotter MD by Manuela Prado, [facility] 2/9/2022 17:33 EST      I, Dr. Trotter, personally performed the services described in the documentation, as scribed in my presence, and it is both accurate and complete.

## 2022-03-15 ENCOUNTER — OUTSIDE FACILITY SERVICE (OUTPATIENT)
Dept: CARDIOLOGY | Facility: CLINIC | Age: 77
End: 2022-03-15

## 2022-03-15 PROCEDURE — 93306 TTE W/DOPPLER COMPLETE: CPT | Performed by: INTERNAL MEDICINE

## 2022-03-15 PROCEDURE — 78452 HT MUSCLE IMAGE SPECT MULT: CPT | Performed by: INTERNAL MEDICINE

## 2022-03-15 PROCEDURE — 93016 CV STRESS TEST SUPVJ ONLY: CPT | Performed by: INTERNAL MEDICINE

## 2022-03-15 PROCEDURE — 93018 CV STRESS TEST I&R ONLY: CPT | Performed by: INTERNAL MEDICINE

## 2022-05-10 ENCOUNTER — OFFICE VISIT (OUTPATIENT)
Dept: CARDIOLOGY | Facility: CLINIC | Age: 77
End: 2022-05-10

## 2022-05-10 VITALS
WEIGHT: 234 LBS | DIASTOLIC BLOOD PRESSURE: 60 MMHG | OXYGEN SATURATION: 95 % | HEIGHT: 70 IN | HEART RATE: 63 BPM | SYSTOLIC BLOOD PRESSURE: 116 MMHG | BODY MASS INDEX: 33.5 KG/M2

## 2022-05-10 DIAGNOSIS — I10 PRIMARY HYPERTENSION: ICD-10-CM

## 2022-05-10 DIAGNOSIS — E78.5 DYSLIPIDEMIA: ICD-10-CM

## 2022-05-10 DIAGNOSIS — I38 VHD (VALVULAR HEART DISEASE): Primary | ICD-10-CM

## 2022-05-10 PROCEDURE — 99214 OFFICE O/P EST MOD 30 MIN: CPT

## 2022-05-10 NOTE — PROGRESS NOTES
[facility] Cardiology    Encounter Date: 05/10/2022    Patient ID: Trevor Jeffers is a 76 y.o. male.  : 1945     PCP: Brian Del Angel MD       Chief Complaint: VHD (valvular heart disease)      PROBLEM LIST:  1. Valvular Heart Disease  a. Longstanding history of heart murmur.  b. Echocardiogram reviewed by Dr. Sapp in [place] showed aortic sclerosis with moderate aortic stenosis and valve area of 1.1 with peak gradient of 24 mmHg. LVEF 70%. No other significant abnormalities.   c. Cardiolite stress test, 2015, reviewed by Dr. Sapp at [facility] showed apical thinning, ejection fraction 61%.  No evidence of ischemia.    d. Chest x-ray,  at [facility] was unremarkable.   e. Stress test 2017: Chemical stress test negative for ischemic ST segment changes. Brief chest discomfort was reported. Myocardial perfusion imaging indicates a normal myocardial perfusion study with no evidence of ischemia. Impressions are consistent with a low risk study. Normal left ventricular systolic function, ejection fraction 73%.  f. Echo 2017: EF 60%. Left atrial cavity size is moderately dilated. Left ventricular diastolic dysfunction (grade I) consistent with impaired relaxation. Mild aortic valve stenosis is present. Mean gradient 14 mmHg. Mild aortic valve regurgitation.  Mild MR.  Mild TR.  g. Echo 19: EF 55 %,moderate concentric LVH, mild MR, severe mitral annular calcification. LIYAH pryor. Echo 03/15/2022: EF 50-55%. Mild concentric LVH. Moderate aortic stenosis. Highest mean AV gradient is 30 mmHg. Trace AR, MR, and TR.   i. NM cardiac rest and stress test 03/15/2022: EF 64%. Normal study. No evidence of infarction or ischemia.   2. Longstanding hypertension.  3. Dyslipidemia.   4. Obesity, status post lap band surgery.    5. Degenerative arthritis.     History of Present Illness  Patient presents today for a 3 month  follow-up with a history of valvular heart disease and cardiac risk factors. Since last visit, patient has been stable from a cardiac standpoint. Patient has had no interim ER visits, hospitalizations, serious illnesses, or injuries. He does note a burning sensation in his chest late at night after he has dinner but denies anginal chest pain, shortness of breath, palpitations, orthopnea, or edema. He denies dizziness or lightheadedness.     No Known Allergies      Current Outpatient Medications:   •  aspirin  MG tablet, TAKE 1 TABLET BY MOUTH EVERY DAY, Disp: , Rfl: 2  •  atorvastatin (LIPITOR) 20 MG tablet, TAKE 1 TABLET EVERY DAY, Disp: , Rfl: 1  •  carvedilol (COREG) 12.5 MG tablet, Take 1 tablet by mouth 2 (Two) Times a Day With Meals. (Patient taking differently: Take 25 mg by mouth Daily.), Disp: 60 tablet, Rfl: 6  •  gabapentin (NEURONTIN) 300 MG capsule, Take 300 mg by mouth Daily., Disp: , Rfl:   •  lidocaine (Lidoderm) 5 %, Place 1 patch on the skin as directed by provider Daily. Remove & Discard patch within 12 hours or as directed by MD, Disp: 30 patch, Rfl: 0  •  losartan-hydrochlorothiazide (HYZAAR) 100-25 MG per tablet, Take 0.5 tablets by mouth Daily., Disp: , Rfl:   •  metFORMIN (GLUCOPHAGE) 500 MG tablet, Take 500 mg by mouth 2 (Two) Times a Day With Meals., Disp: , Rfl:   •  traMADol (ULTRAM) 50 MG tablet, Take 50 mg by mouth Every 6 (Six) Hours As Needed for Moderate Pain ., Disp: , Rfl:     The following portions of the patient's history were reviewed and updated as appropriate: allergies, current medications, past family history, past medical history, past social history, past surgical history and problem list.    ROS  Review of Systems   Constitution: Negative for chills, fever, fatigue, generalized weakness.   Cardiovascular: Negative for chest pain, dyspnea on exertion, leg swelling, palpitations, orthopnea, and syncope.   Respiratory: Negative for cough, shortness of breath, and  "wheezing.  HENT: Negative for ear pain, nosebleeds, and tinnitus.  Gastrointestinal: Negative for abdominal pain, constipation, diarrhea, nausea and vomiting.   Genitourinary: No urinary symptoms.  Musculoskeletal: Negative for muscle cramps.  Neurological: Negative for dizziness, headaches, loss of balance, numbness, and symptoms of stroke.  Psychiatric: Normal mental status.     All other systems reviewed and are negative.        Objective:     /60 (BP Location: Left arm, Patient Position: Sitting)   Pulse 63   Ht 177.8 cm (70\")   Wt 106 kg (234 lb)   SpO2 95%   BMI 33.58 kg/m²      Physical Exam  Constitutional: Patient appears well-developed and well-nourished.   HENT: HEENT exam unremarkable.   Neck: Neck supple. No JVD present. No carotid bruits.   Cardiovascular: Normal rate, regular rhythm and normal heart sounds. No murmur heard.   2+ symmetric pulses.   Pulmonary/Chest: Breath sounds normal. Does not exhibit tenderness.   Abdominal: Abdomen benign.   Musculoskeletal: Does not exhibit edema.   Neurological: Neurological exam unremarkable.   Vitals reviewed.    Data Review:   I have reviewed the patients most recent stress test and echo results with him.       Lab date: 10/02/2018  • FLP: , , HDL 46,         Assessment:      Diagnosis Plan   1. VHD (valvular heart disease)  I have reviewed the patients most recent stress test and echo results with him. AS with max gradient of 30mmHg. Patient is currently asymptomatic. We will continue to monitor with echocardiogram.    2. Primary hypertension  Well controlled. Okay to decrease coreg to 12.5mg BID.    3. Dyslipidemia  Acceptable control. Continue current therapy.      Plan:   Stable cardiac status.   I have reviewed the stress and echo with the patient and he is reassured. We discussed possible symptoms of progressive AS. He will call our office with any concerns.   Continue current medications.   FU in 3 MO, sooner as " needed.  Thank you for allowing us to participate in the care of your patient.     Katrin Marlow PA-C      Please note that portions of this note may have been completed with a voice recognition program. Efforts were made to edit the dictations, but occasionally words are mistranscribed.

## 2022-08-24 ENCOUNTER — OFFICE VISIT (OUTPATIENT)
Dept: CARDIOLOGY | Facility: CLINIC | Age: 77
End: 2022-08-24

## 2022-08-24 VITALS
HEIGHT: 70 IN | DIASTOLIC BLOOD PRESSURE: 54 MMHG | OXYGEN SATURATION: 98 % | WEIGHT: 217 LBS | BODY MASS INDEX: 31.07 KG/M2 | HEART RATE: 80 BPM | SYSTOLIC BLOOD PRESSURE: 93 MMHG

## 2022-08-24 DIAGNOSIS — I38 VALVULAR HEART DISEASE: ICD-10-CM

## 2022-08-24 DIAGNOSIS — I10 PRIMARY HYPERTENSION: ICD-10-CM

## 2022-08-24 DIAGNOSIS — Z95.2 S/P AORTIC VALVE REPLACEMENT: Primary | ICD-10-CM

## 2022-08-24 DIAGNOSIS — E78.00 PURE HYPERCHOLESTEROLEMIA: ICD-10-CM

## 2022-08-24 DIAGNOSIS — I25.10 CORONARY ARTERY DISEASE INVOLVING NATIVE CORONARY ARTERY OF NATIVE HEART WITHOUT ANGINA PECTORIS: ICD-10-CM

## 2022-08-24 PROBLEM — I97.89 POSTOPERATIVE ATRIAL FIBRILLATION: Status: ACTIVE | Noted: 2022-08-24

## 2022-08-24 PROBLEM — Z95.1 S/P CABG (CORONARY ARTERY BYPASS GRAFT): Status: ACTIVE | Noted: 2022-07-11

## 2022-08-24 PROBLEM — I35.0 SEVERE AORTIC STENOSIS: Status: ACTIVE | Noted: 2022-08-24

## 2022-08-24 PROBLEM — I48.91 POSTOPERATIVE ATRIAL FIBRILLATION (HCC): Status: ACTIVE | Noted: 2022-08-24

## 2022-08-24 PROBLEM — M19.079 PRIMARY LOCALIZED OSTEOARTHROSIS OF ANKLE AND FOOT: Status: ACTIVE | Noted: 2022-08-24

## 2022-08-24 PROCEDURE — 99214 OFFICE O/P EST MOD 30 MIN: CPT | Performed by: PHYSICIAN ASSISTANT

## 2022-08-24 RX ORDER — INSULIN GLARGINE 100 [IU]/ML
25 INJECTION, SOLUTION SUBCUTANEOUS DAILY
COMMUNITY
Start: 2022-06-09

## 2022-08-24 RX ORDER — POTASSIUM CHLORIDE 1500 MG/1
20 TABLET, FILM COATED, EXTENDED RELEASE ORAL DAILY
COMMUNITY
Start: 2022-07-31 | End: 2023-02-27

## 2022-08-24 RX ORDER — AMLODIPINE BESYLATE 10 MG/1
5 TABLET ORAL DAILY
COMMUNITY
Start: 2022-07-31 | End: 2023-02-27 | Stop reason: ALTCHOICE

## 2022-08-24 RX ORDER — PANTOPRAZOLE SODIUM 40 MG/1
40 TABLET, DELAYED RELEASE ORAL
COMMUNITY
Start: 2022-07-21

## 2022-08-24 RX ORDER — ATORVASTATIN CALCIUM 80 MG/1
80 TABLET, FILM COATED ORAL
COMMUNITY
Start: 2022-07-31

## 2022-08-24 RX ORDER — CARVEDILOL 6.25 MG/1
6.25 TABLET ORAL 2 TIMES DAILY WITH MEALS
COMMUNITY

## 2022-08-24 RX ORDER — AMIODARONE HYDROCHLORIDE 200 MG/1
200 TABLET ORAL DAILY
COMMUNITY
Start: 2022-07-31 | End: 2023-02-27

## 2022-08-24 RX ORDER — HYDRALAZINE HYDROCHLORIDE 50 MG/1
50 TABLET, FILM COATED ORAL EVERY 8 HOURS
COMMUNITY
Start: 2022-07-21 | End: 2022-08-24

## 2022-08-24 RX ORDER — LOSARTAN POTASSIUM 100 MG/1
100 TABLET ORAL DAILY
Qty: 30 TABLET | Refills: 5 | Status: SHIPPED | OUTPATIENT
Start: 2022-08-24 | End: 2022-10-26 | Stop reason: SDUPTHER

## 2022-08-24 RX ORDER — OXYCODONE HYDROCHLORIDE AND ACETAMINOPHEN 5; 325 MG/1; MG/1
TABLET ORAL
COMMUNITY
Start: 2022-08-01 | End: 2022-08-24 | Stop reason: ALTCHOICE

## 2022-08-24 RX ORDER — BUMETANIDE 1 MG/1
1 TABLET ORAL DAILY
COMMUNITY
Start: 2022-07-31 | End: 2022-08-24 | Stop reason: ALTCHOICE

## 2022-08-24 RX ORDER — GABAPENTIN 300 MG/1
300 CAPSULE ORAL 2 TIMES DAILY
COMMUNITY

## 2022-08-24 RX ORDER — CLOPIDOGREL BISULFATE 75 MG/1
75 TABLET ORAL DAILY
COMMUNITY
Start: 2022-07-31

## 2022-08-24 RX ORDER — ASPIRIN 81 MG/1
81 TABLET, CHEWABLE ORAL DAILY
COMMUNITY
Start: 2022-07-22

## 2022-08-24 NOTE — PROGRESS NOTES
Subjective   Trevor Jeffers is a 76 y.o. male     Chief Complaint   Patient presents with   • New Patient   • Establish Care   Problem list  1.  Coronary artery disease  1.1 coronary artery bypass grafting July 2022 at [facility] in [place], inadequate data  2.  Aortic stenosis  2.1 moderate aortic stenosis by last evaluation in [place] 2022  2.2 episode of syncope and hospitalization while in [place] with subsequent aortic valve replacement and coronary artery bypass grafting performed in July 2022  3.  Hypertension  4.  Dyslipidemia  5.  Insulin-dependent diabetes mellitus    HPI    Patient is a 76-year-old male who presents to the office to establish care.  Patient has been seen previously by cardiology in [place].  Patient apparently was seen at [facility] from a physician that saw patient some elevated from [facility] in [place].    As above he has had lower stress test and even aortic stenosis identified by echo which was moderate.    Patient was traveling in [place] when he had a syncopal episode.  He was taken to the hospital and subsequently underwent cardiac evaluation.  He apparently had significant aortic stenosis and evidence of coronary disease.  Patient underwent coronary artery bypass grafting as well as aortic valve replacement, presumably with a bioprosthetic valve as patient is not on anticoagulation, during that hospitalization.  Patient was also placed on amiodarone as apparently he may have had some type of rhythm issue during that hospitalization.  We are currently trying to obtain records at this time    Since that time, patient is felt well.  He has no chest pain or pressure or dyspnea is mild.  His functional status appears to be normal.  He does not describe any PND or orthopnea.    He does not describe palpitating.  He does not describe any associated dizziness, presyncope or syncope.    He has felt weakness and lightheadedness.  Patient has noted that his blood  pressure fluctuates significantly and currently is 93/54 mmHg.  He describes blood pressure yesterday being in the 140s millimeters mercury systolic.  Otherwise he has done well    Current Outpatient Medications   Medication Sig Dispense Refill   • amiodarone (PACERONE) 200 MG tablet Take 200 mg by mouth Daily.     • amLODIPine (NORVASC) 10 MG tablet Take 10 mg by mouth Daily.     • aspirin 81 MG chewable tablet 81 mg.     • atorvastatin (LIPITOR) 80 MG tablet Take 80 mg by mouth every night at bedtime.     • carvedilol (COREG) 12.5 MG tablet Take 12.5 mg by mouth 2 (Two) Times a Day With Meals.     • clopidogrel (PLAVIX) 75 MG tablet Take 75 mg by mouth Daily.     • gabapentin (NEURONTIN) 300 MG capsule Take 300 mg by mouth.     • Lantus SoloStar 100 UNIT/ML injection pen Inject 20 Units under the skin into the appropriate area as directed Daily.     • pantoprazole (PROTONIX) 40 MG EC tablet Take 40 mg by mouth.     • potassium chloride ER (K-TAB) 20 MEQ tablet controlled-release ER tablet Take 40 mEq by mouth Daily.     • traMADol (ULTRAM) 50 MG tablet Take 50 mg by mouth Every 6 (Six) Hours As Needed for Moderate Pain .     • losartan (Cozaar) 100 MG tablet Take 1 tablet by mouth Daily. 30 tablet 5     No current facility-administered medications for this visit.       Patient has no known allergies.    Past Medical History:   Diagnosis Date   • Anemia    • Chronic pain    • Degenerative arthritis    • Diabetes (HCC)     SPOUSE REPORTS SLIGHTLY ELEVATED GLUCOSE AT LAST APPOINTMENT, TAKING NO MEDS   • Dyslipidemia    • Hyperlipidemia    • Hypertension     Long Standing    • Lower extremity edema     Mild, which is self limiting.    • Murmur, cardiac    • Obesity     s/p lap band surg    • Old myocardial infarction    • Osteoarthritis    • Wears dentures     SPOUSE REPORTS PATIENT WEAR AN UPPER PLATE   • Wears glasses        Social History     Socioeconomic History   • Marital status:    Tobacco Use   •  "Smoking status: Never Smoker   • Smokeless tobacco: Never Used   Vaping Use   • Vaping Use: Never used   Substance and Sexual Activity   • Alcohol use: No   • Drug use: No   • Sexual activity: Defer       Family History   Problem Relation Age of Onset   • No Known Problems Mother    • No Known Problems Father    • Colon cancer Neg Hx        Review of Systems   Constitutional: Negative.  Negative for chills and fever.   HENT: Negative.  Negative for congestion and sinus pressure.    Respiratory: Positive for shortness of breath. Negative for chest tightness.    Cardiovascular: Positive for leg swelling. Negative for chest pain and palpitations.   Gastrointestinal: Negative.  Negative for abdominal distention, anal bleeding, blood in stool, constipation, diarrhea, nausea and vomiting.   Endocrine: Negative.  Negative for cold intolerance and heat intolerance.   Genitourinary: Negative.  Negative for dysuria, frequency, hematuria and urgency.   Neurological: Positive for dizziness (at dizziness, with position change). Negative for syncope and light-headedness.   Psychiatric/Behavioral: Negative.  Negative for sleep disturbance (denies waking with soa or cp).       Objective   Vitals:    08/24/22 1048   BP: 93/54   BP Location: Left arm   Patient Position: Sitting   Pulse: 80   SpO2: 98%   Weight: 98.4 kg (217 lb)   Height: 177.8 cm (70\")      BP 93/54 (BP Location: Left arm, Patient Position: Sitting)   Pulse 80   Ht 177.8 cm (70\")   Wt 98.4 kg (217 lb)   SpO2 98%   BMI 31.14 kg/m²     Lab Results (most recent)     None          Physical Exam  Vitals and nursing note reviewed.   Constitutional:       General: He is not in acute distress.     Appearance: Normal appearance. He is well-developed.   HENT:      Head: Normocephalic and atraumatic.   Eyes:      General: No scleral icterus.        Right eye: No discharge.         Left eye: No discharge.      Conjunctiva/sclera: Conjunctivae normal.   Neck:      Vascular: " No carotid bruit.   Cardiovascular:      Rate and Rhythm: Normal rate and regular rhythm.      Heart sounds: Murmur heard.    Systolic murmur is present with a grade of 1/6.    No friction rub. No gallop.   Pulmonary:      Effort: Pulmonary effort is normal. No respiratory distress.      Breath sounds: Normal breath sounds. No wheezing or rales.   Chest:      Chest wall: No tenderness.   Musculoskeletal:      Right lower leg: No edema.      Left lower leg: No edema.   Skin:     General: Skin is warm and dry.      Coloration: Skin is not pale.      Findings: No erythema or rash.   Neurological:      Mental Status: He is alert and oriented to person, place, and time.      Cranial Nerves: No cranial nerve deficit.   Psychiatric:         Behavior: Behavior normal.         Procedure   Procedures         Assessment & Plan     Problems Addressed this Visit        Cardiac and Vasculature    Valvular heart disease    Relevant Medications    amLODIPine (NORVASC) 10 MG tablet    clopidogrel (PLAVIX) 75 MG tablet    carvedilol (COREG) 12.5 MG tablet    Hypertension    Relevant Medications    amLODIPine (NORVASC) 10 MG tablet    carvedilol (COREG) 12.5 MG tablet    losartan (Cozaar) 100 MG tablet    Other Relevant Orders    Adult Transthoracic Echo Complete W/ Cont if Necessary Per Protocol    CAD (coronary artery disease)    Relevant Medications    amLODIPine (NORVASC) 10 MG tablet    clopidogrel (PLAVIX) 75 MG tablet    carvedilol (COREG) 12.5 MG tablet    Other Relevant Orders    Adult Transthoracic Echo Complete W/ Cont if Necessary Per Protocol    Pure hypercholesterolemia    Relevant Medications    atorvastatin (LIPITOR) 80 MG tablet      Other Visit Diagnoses     S/P aortic valve replacement    -  Primary    Relevant Orders    Adult Transthoracic Echo Complete W/ Cont if Necessary Per Protocol      Diagnoses       Codes Comments    S/P aortic valve replacement    -  Primary ICD-10-CM: Z95.2  ICD-9-CM: V43.3     Coronary  artery disease involving native coronary artery of native heart without angina pectoris     ICD-10-CM: I25.10  ICD-9-CM: 414.01     Primary hypertension     ICD-10-CM: I10  ICD-9-CM: 401.9     Pure hypercholesterolemia     ICD-10-CM: E78.00  ICD-9-CM: 272.0     Valvular heart disease     ICD-10-CM: I38  ICD-9-CM: 424.90         Recommendation  1.  Patient is a 76-year-old male that had coronary artery bypass grafting and aortic valve replacement.  We are currently trying to obtain records from [place].  From our standpoint, clinically, he appears to be doing remarkably well with no angina or failure symptoms.  He does have mild edema at times and short of breath.  However, this apparently has improved.  I would like to schedule echocardiogram to evaluate cardiac structure and function since patient had procedure performed.    2.  Otherwise, he appears to be doing well.  Blood pressure is low today.  I would like to stop hydralazine.  I would like for him to be back on losartan which apparently he was on before.  I will need to keep a blood pressure log and call back in 1 week with blood pressure readings    3.  Otherwise, patient appears to be doing well.  Patient on high-dose statin.  Total cholesterol appears to be within normal limits although LDL apparently is 101.  I discussed the possibility of PCKS9 inhibitor such as Repatha which might be better controlling his dyslipidemia.  In the future, if his LDL continues to be significantly elevated, adding Zetia or considering medication like Repatha might be better at controlling his dyslipidemia.    4.  For now, want to see him back for follow-up after echocardiogram.  We will wait to hear from him in 1 week.  Follow-up with primary as scheduled               Trevor Jeffers  reports that he has never smoked. He has never used smokeless tobacco..     Patient brought in medicine list to appointment, it's been reviewed with patient and med list was updated  in the chart.     Advance Care Planning   ACP discussion was held with the patient during this visit. Patient does not have an advance directive, declines further assistance.         Electronically signed by:

## 2022-08-29 ENCOUNTER — TELEPHONE (OUTPATIENT)
Dept: CARDIOLOGY | Facility: CLINIC | Age: 77
End: 2022-08-29

## 2022-08-29 RX ORDER — BUMETANIDE 1 MG/1
1 TABLET ORAL DAILY
COMMUNITY
End: 2023-02-27

## 2022-08-29 RX ORDER — BUMETANIDE 0.5 MG/1
1 TABLET ORAL DAILY
COMMUNITY
End: 2022-08-29 | Stop reason: SDUPTHER

## 2022-08-30 NOTE — TELEPHONE ENCOUNTER
Patient brought in list of blood pressure readings, states his blood pressure drops when he stands up. Per Kris KINGSLEY patient to come in for nurse visit to have othos done. Patient notified, and instructed to bring med bottles with him to nurse visit. Appointment Wendnesday August 31st @ 11:00 am. Patient verbalized understanding. Thi TRISTAN

## 2022-08-31 ENCOUNTER — CLINICAL SUPPORT (OUTPATIENT)
Dept: CARDIOLOGY | Facility: CLINIC | Age: 77
End: 2022-08-31

## 2022-08-31 VITALS
WEIGHT: 215 LBS | HEIGHT: 70 IN | DIASTOLIC BLOOD PRESSURE: 70 MMHG | HEART RATE: 84 BPM | BODY MASS INDEX: 30.78 KG/M2 | OXYGEN SATURATION: 99 % | SYSTOLIC BLOOD PRESSURE: 109 MMHG

## 2022-08-31 PROCEDURE — 99211 OFF/OP EST MAY X REQ PHY/QHP: CPT | Performed by: PHYSICIAN ASSISTANT

## 2022-08-31 NOTE — PROGRESS NOTES
Trevor Jeffers  1945  8/31/2022   ?   Chief Complaint   Patient presents with   • Nurse Visit     Blood pressure check, orthos      ?   HPI:   ?   ?   ? Patient presents for blood pressure check, orthos. Patient complains of dizziness, states blood pressure drops at times when standing.     Current Outpatient Medications:   •  amiodarone (PACERONE) 200 MG tablet, Take 200 mg by mouth Daily., Disp: , Rfl:   •  amLODIPine (NORVASC) 10 MG tablet, Take 10 mg by mouth Daily., Disp: , Rfl:   •  aspirin 81 MG chewable tablet, 81 mg., Disp: , Rfl:   •  atorvastatin (LIPITOR) 80 MG tablet, Take 80 mg by mouth every night at bedtime., Disp: , Rfl:   •  bumetanide (BUMEX) 1 MG tablet, Take 1 mg by mouth Daily., Disp: , Rfl:   •  carvedilol (COREG) 6.25 MG tablet, Take 6.25 mg by mouth 2 (Two) Times a Day With Meals., Disp: , Rfl:   •  clopidogrel (PLAVIX) 75 MG tablet, Take 75 mg by mouth Daily., Disp: , Rfl:   •  gabapentin (NEURONTIN) 300 MG capsule, Take 300 mg by mouth 2 (Two) Times a Day., Disp: , Rfl:   •  Lantus SoloStar 100 UNIT/ML injection pen, Inject 20 Units under the skin into the appropriate area as directed Daily., Disp: , Rfl:   •  losartan (Cozaar) 100 MG tablet, Take 1 tablet by mouth Daily., Disp: 30 tablet, Rfl: 5  •  pantoprazole (PROTONIX) 40 MG EC tablet, Take 40 mg by mouth., Disp: , Rfl:   •  potassium chloride ER (K-TAB) 20 MEQ tablet controlled-release ER tablet, Take 40 mEq by mouth Daily., Disp: , Rfl:   •  traMADol (ULTRAM) 50 MG tablet, Take 50 mg by mouth Every 6 (Six) Hours As Needed for Moderate Pain ., Disp: , Rfl:    ?   ?   Patient has no known allergies.       Procedures     ?   Assessment & Plan    ?   ? 1. Dizziness        2. Blood pressure check, orthos    Per Kris KINGSLEY decrease Norvasc 10 mg to HALF tablet daily. Continue to monitor blood pressures. If continues to drop Kris will stop Norvasc. Patient and daughter verbalized understanding. Thi TRISTAN  ?

## 2022-10-04 ENCOUNTER — HOSPITAL ENCOUNTER (OUTPATIENT)
Dept: CARDIOLOGY | Facility: HOSPITAL | Age: 77
Discharge: HOME | End: 2022-10-04
Admitting: PHYSICIAN ASSISTANT

## 2022-10-04 DIAGNOSIS — Z95.2 S/P AORTIC VALVE REPLACEMENT: ICD-10-CM

## 2022-10-04 DIAGNOSIS — I25.10 CORONARY ARTERY DISEASE INVOLVING NATIVE CORONARY ARTERY OF NATIVE HEART WITHOUT ANGINA PECTORIS: ICD-10-CM

## 2022-10-04 DIAGNOSIS — I10 PRIMARY HYPERTENSION: ICD-10-CM

## 2022-10-04 PROCEDURE — 93306 TTE W/DOPPLER COMPLETE: CPT | Performed by: INTERNAL MEDICINE

## 2022-10-04 PROCEDURE — 93306 TTE W/DOPPLER COMPLETE: CPT

## 2022-10-26 RX ORDER — LOSARTAN POTASSIUM 100 MG/1
100 TABLET ORAL DAILY
Qty: 90 TABLET | Refills: 3 | Status: SHIPPED | OUTPATIENT
Start: 2022-10-26 | End: 2023-02-27 | Stop reason: ALTCHOICE

## 2022-10-29 LAB
BH CV ECHO MEAS - ACS: 1.67 CM
BH CV ECHO MEAS - AO MAX PG: 9.6 MMHG
BH CV ECHO MEAS - AO MEAN PG: 6.9 MMHG
BH CV ECHO MEAS - AO ROOT DIAM: 2.7 CM
BH CV ECHO MEAS - AO V2 MAX: 152.9 CM/SEC
BH CV ECHO MEAS - AO V2 VTI: 34.1 CM
BH CV ECHO MEAS - AVA(I,D): 2.7 CM2
BH CV ECHO MEAS - EDV(CUBED): 19.2 ML
BH CV ECHO MEAS - EDV(MOD-SP4): 56.6 ML
BH CV ECHO MEAS - EF(MOD-SP4): 59.4 %
BH CV ECHO MEAS - ESV(CUBED): 8.2 ML
BH CV ECHO MEAS - ESV(MOD-SP4): 23 ML
BH CV ECHO MEAS - FS: 24.6 %
BH CV ECHO MEAS - IVS/LVPW: 1.15 CM
BH CV ECHO MEAS - IVSD: 1.43 CM
BH CV ECHO MEAS - LA DIMENSION: 4.6 CM
BH CV ECHO MEAS - LAT PEAK E' VEL: 7 CM/SEC
BH CV ECHO MEAS - LV DIASTOLIC VOL/BSA (35-75): 26.3 CM2
BH CV ECHO MEAS - LV MASS(C)D: 112 GRAMS
BH CV ECHO MEAS - LV MAX PG: 5.1 MMHG
BH CV ECHO MEAS - LV MEAN PG: 3.3 MMHG
BH CV ECHO MEAS - LV SYSTOLIC VOL/BSA (12-30): 10.7 CM2
BH CV ECHO MEAS - LV V1 MAX: 112.8 CM/SEC
BH CV ECHO MEAS - LV V1 VTI: 26.3 CM
BH CV ECHO MEAS - LVIDD: 2.7 CM
BH CV ECHO MEAS - LVIDS: 2.02 CM
BH CV ECHO MEAS - LVOT AREA: 3.5 CM2
BH CV ECHO MEAS - LVOT DIAM: 2.11 CM
BH CV ECHO MEAS - LVPWD: 1.24 CM
BH CV ECHO MEAS - MED PEAK E' VEL: 4.9 CM/SEC
BH CV ECHO MEAS - MV A MAX VEL: 102.4 CM/SEC
BH CV ECHO MEAS - MV DEC SLOPE: 316.9 CM/SEC2
BH CV ECHO MEAS - MV E MAX VEL: 89.5 CM/SEC
BH CV ECHO MEAS - MV E/A: 0.87
BH CV ECHO MEAS - RVDD: 2.41 CM
BH CV ECHO MEAS - SI(MOD-SP4): 15.6 ML/M2
BH CV ECHO MEAS - SV(LVOT): 92 ML
BH CV ECHO MEAS - SV(MOD-SP4): 33.6 ML
BH CV ECHO MEASUREMENTS AVERAGE E/E' RATIO: 15.04
LEFT ATRIUM VOLUME INDEX: 20.5 ML/M2
MAXIMAL PREDICTED HEART RATE: 145 BPM
STRESS TARGET HR: 123 BPM

## 2022-11-01 ENCOUNTER — TELEPHONE (OUTPATIENT)
Dept: CARDIOLOGY | Facility: CLINIC | Age: 77
End: 2022-11-01

## 2022-11-01 NOTE — TELEPHONE ENCOUNTER
Patient informed to keep Follow up with Kris in February. Patient verbalized understanding. Thi TRISTAN      ----- Message from TEETEE Wiggins sent at 10/31/2022 10:20 AM EDT -----  3 to 4-month follow-up  Adult Transthoracic Echo Complete W/ Cont if Necessary Per Protocol  Order: [id number]   Status: Final result      Visible to patient: Yes (not seen)      Dx: Primary hypertension; S/P aortic valv...      1 Result Note  Details    Reading Physician Reading Date Result Priority   Casa Pappas MD  [phone] 10/4/2022 Routine     Result Text  Borderline technically adequate study.     1.  LV size, function, and wall motion are normal.  Visually estimated ejection fraction is 55 to 60%.  Grade 1A diastolic dysfunction.  Mild concentric left ventricular hypertrophy.  Mild to moderate left atrial enlargement.  Right heart chambers are grossly normal.  No septal defect or intracavitary mass or thrombus.     2.  The mitral valve is morphologically and physiologically normal.  The aortic valve is not well visualized, by history there is a bioprosthetic valve in place.  There is no unusual sewing ring motion.  Parameters include a peak instantaneous gradient of 10 and a mean gradient of 7 and an aortic valve area of 2.7 cm².  No AI is visualized.  Right-sided heart valves are unremarkable.     3.  No pericardial or great vessel pathology.     4.  Pulmonary artery pressures cannot be calculated.

## 2023-02-27 ENCOUNTER — OFFICE VISIT (OUTPATIENT)
Dept: CARDIOLOGY | Facility: CLINIC | Age: 78
End: 2023-02-27
Payer: MEDICARE

## 2023-02-27 VITALS
WEIGHT: 227.2 LBS | SYSTOLIC BLOOD PRESSURE: 95 MMHG | DIASTOLIC BLOOD PRESSURE: 56 MMHG | HEIGHT: 70 IN | OXYGEN SATURATION: 97 % | BODY MASS INDEX: 32.53 KG/M2 | HEART RATE: 75 BPM

## 2023-02-27 DIAGNOSIS — I48.91 ATRIAL FIBRILLATION, UNSPECIFIED TYPE: ICD-10-CM

## 2023-02-27 DIAGNOSIS — I38 VALVULAR HEART DISEASE: ICD-10-CM

## 2023-02-27 DIAGNOSIS — I10 PRIMARY HYPERTENSION: ICD-10-CM

## 2023-02-27 DIAGNOSIS — I25.10 CORONARY ARTERY DISEASE INVOLVING NATIVE CORONARY ARTERY OF NATIVE HEART WITHOUT ANGINA PECTORIS: Primary | ICD-10-CM

## 2023-02-27 PROCEDURE — 99214 OFFICE O/P EST MOD 30 MIN: CPT | Performed by: PHYSICIAN ASSISTANT

## 2023-02-27 RX ORDER — LOSARTAN POTASSIUM AND HYDROCHLOROTHIAZIDE 25; 100 MG/1; MG/1
0.5 TABLET ORAL DAILY
COMMUNITY
Start: 2023-02-02

## 2023-02-27 NOTE — PROGRESS NOTES
Problem list:    1.  Coronary artery disease  1.1 coronary artery bypass grafting July 2022 at [facility] in [place], inadequate data  2.  Aortic stenosis  2.1 moderate aortic stenosis by last evaluation in [place] 2022  2.2 episode of syncope and hospitalization while in [place] with subsequent aortic valve replacement, left atrial appendage clipping and coronary artery bypass grafting performed in July 2022  3.  Hypertension  4.  Dyslipidemia  5.  Insulin-dependent diabetes mellitus  6.  Postoperative atrial fibrillation  6 1 left atrial appendage clipping during coronary bypass grafting.      Subjective   Trevor Jeffers is a 77 y.o. male     Chief Complaint   Patient presents with   • Follow-up     Here for testing f/u   • Cardiac Valve Problem     VHD   • Hyperlipidemia   • Hypertension   • Atrial Fibrillation   • Coronary Artery Disease       HPI    Patient is a 77-year-old male who presents to the office for evaluation.    Patient is doing well without any symptoms.  No chest pain or pressure.  No complaints of dyspnea.  No PND orthopnea.    Patient does not describe palpitating or complain of dysrhythmic symptoms.  Otherwise patient is doing well.                Current Outpatient Medications on File Prior to Visit   Medication Sig Dispense Refill   • aspirin 81 MG chewable tablet Chew 81 mg Daily.     • atorvastatin (LIPITOR) 80 MG tablet Take 80 mg by mouth every night at bedtime.     • carvedilol (COREG) 6.25 MG tablet Take 6.25 mg by mouth 2 (Two) Times a Day With Meals.     • clopidogrel (PLAVIX) 75 MG tablet Take 75 mg by mouth Daily.     • gabapentin (NEURONTIN) 300 MG capsule Take 300 mg by mouth 2 (Two) Times a Day.     • Lantus SoloStar 100 UNIT/ML injection pen Inject 25 Units under the skin into the appropriate area as directed Daily.     • losartan-hydrochlorothiazide (HYZAAR) 100-25 MG per tablet 0.5 tablets Daily.     • pantoprazole (PROTONIX) 40 MG EC tablet Take 40 mg by  mouth.     • traMADol (ULTRAM) 50 MG tablet Take 50 mg by mouth Every 6 (Six) Hours As Needed for Moderate Pain .     • [DISCONTINUED] amiodarone (PACERONE) 200 MG tablet Take 200 mg by mouth Daily.     • [DISCONTINUED] bumetanide (BUMEX) 1 MG tablet Take 1 mg by mouth Daily.     • [DISCONTINUED] potassium chloride ER (K-TAB) 20 MEQ tablet controlled-release ER tablet Take 20 mEq by mouth Daily.     • [DISCONTINUED] amLODIPine (NORVASC) 10 MG tablet Take 5 mg by mouth Daily. HALF TAB DAILY     • [DISCONTINUED] losartan (Cozaar) 100 MG tablet Take 1 tablet by mouth Daily. 90 tablet 3     No current facility-administered medications on file prior to visit.       Patient has no known allergies.    Past Medical History:   Diagnosis Date   • Anemia    • Chronic pain    • Degenerative arthritis    • Diabetes (HCC)     SPOUSE REPORTS SLIGHTLY ELEVATED GLUCOSE AT LAST APPOINTMENT, TAKING NO MEDS   • Dyslipidemia    • Hyperlipidemia    • Hypertension     Long Standing    • Lower extremity edema     Mild, which is self limiting.    • Murmur, cardiac    • Obesity     s/p lap band surg    • Old myocardial infarction    • Osteoarthritis    • Wears dentures     SPOUSE REPORTS PATIENT WEAR AN UPPER PLATE   • Wears glasses        Social History     Socioeconomic History   • Marital status:    Tobacco Use   • Smoking status: Never   • Smokeless tobacco: Never   Vaping Use   • Vaping Use: Never used   Substance and Sexual Activity   • Alcohol use: No   • Drug use: No   • Sexual activity: Defer       Family History   Problem Relation Age of Onset   • No Known Problems Mother    • No Known Problems Father    • Colon cancer Neg Hx        Review of Systems   Constitutional: Negative.    HENT: Negative.    Eyes: Positive for visual disturbance (glasses prn).   Respiratory: Negative.    Cardiovascular: Positive for leg swelling (mildly). Negative for chest pain and palpitations.   Gastrointestinal: Negative.    Endocrine: Negative.   "  Genitourinary: Negative.    Musculoskeletal: Positive for arthralgias, back pain and myalgias.   Skin: Negative.    Allergic/Immunologic: Negative.    Neurological: Positive for dizziness and light-headedness. Negative for syncope.   Hematological: Bruises/bleeds easily.   Psychiatric/Behavioral: Negative.        Objective   Vitals:    02/27/23 1416   BP: 95/56   BP Location: Left arm   Patient Position: Sitting   Pulse: 75   SpO2: 97%   Weight: 103 kg (227 lb 3.2 oz)   Height: 177.8 cm (70\")      BP 95/56 (BP Location: Left arm, Patient Position: Sitting)   Pulse 75   Ht 177.8 cm (70\")   Wt 103 kg (227 lb 3.2 oz)   SpO2 97%   BMI 32.60 kg/m²     Lab Results (most recent)     None          Physical Exam  Vitals and nursing note reviewed.   Constitutional:       General: He is not in acute distress.     Appearance: Normal appearance. He is well-developed.   HENT:      Head: Normocephalic and atraumatic.   Eyes:      General: No scleral icterus.        Right eye: No discharge.         Left eye: No discharge.      Conjunctiva/sclera: Conjunctivae normal.   Neck:      Vascular: No carotid bruit.   Cardiovascular:      Rate and Rhythm: Normal rate and regular rhythm.      Heart sounds: Normal heart sounds. No murmur heard.    No friction rub. No gallop.   Pulmonary:      Effort: Pulmonary effort is normal. No respiratory distress.      Breath sounds: Normal breath sounds. No wheezing or rales.   Chest:      Chest wall: No tenderness.   Musculoskeletal:      Right lower leg: No edema.      Left lower leg: No edema.   Skin:     General: Skin is warm and dry.      Coloration: Skin is not pale.      Findings: No erythema or rash.   Neurological:      Mental Status: He is alert and oriented to person, place, and time.      Cranial Nerves: No cranial nerve deficit.   Psychiatric:         Behavior: Behavior normal.         Procedure   Procedures       Assessment & Plan     Problems Addressed this Visit        Cardiac and " Vasculature    Valvular heart disease    Primary hypertension    Relevant Medications    losartan-hydrochlorothiazide (HYZAAR) 100-25 MG per tablet    CAD (coronary artery disease) - Primary    Atrial fibrillation (HCC)   Diagnoses       Codes Comments    Coronary artery disease involving native coronary artery of native heart without angina pectoris    -  Primary ICD-10-CM: I25.10  ICD-9-CM: 414.01     Valvular heart disease     ICD-10-CM: I38  ICD-9-CM: 424.90     Primary hypertension     ICD-10-CM: I10  ICD-9-CM: 401.9     Atrial fibrillation, unspecified type (HCC)     ICD-10-CM: I48.91  ICD-9-CM: 427.31       Recommendation  1.  Patient is a 77-year-old male who presents back for follow-up that is done well.  He has history of coronary artery disease with no angina.  He has done well on medical therapy.  For now, would recommend continue medical therapy and close lipid monitoring.    2..  Patient with valvular heart disease status post aortic valve replacement.  Follow-up echocardiogram shows stable parameters with preserved systolic function.  For now, we can continue to monitor    3.  Patient with postoperative atrial fibrillation with left atrial appendage clipping during bypass.  He has no rhythm issues and I am stopping amiodarone at this time    4.  Patient with baseline hypertension.  He has had orthostatic issues and dizziness.  I would like to adjust his medications.  With his LV function being normal and him having grade 1 diastolic dysfunction, want to stop Bumex and potassium supplement but also stop amiodarone as well.  If his blood pressure elevates, want him to take the whole pill of losartan.  Apparently, he decrease this to half a pill because of issues.    5.  Otherwise, I will see him back for follow-up in a few months to reevaluate.  He is to follow with primary as scheduled.    /Advance Care Planning   ACP discussion was held with the patient during this visit. Patient does not have an  advance directive, declines further assistance.              Trevor Jeffers  reports that he has never smoked. He has never used smokeless tobacco.. I have educated him on the risk of diseases from using tobacco products such as cancer, COPD and heart disease.               BMI is >= 30 and <35. (Class 1 Obesity). The following options were offered after discussion;: pcp addressing       Electronically signed by:

## 2023-06-07 ENCOUNTER — TELEPHONE (OUTPATIENT)
Dept: CARDIOLOGY | Facility: CLINIC | Age: 78
End: 2023-06-07
Payer: MEDICARE

## 2023-06-07 NOTE — TELEPHONE ENCOUNTER
Received cardiac clearance request from  stating pt has LYNNE/LESI scheduled for 06/20/2023 and is requiring a cardiac clearance. Placed cardiac clearance request in Estrada's inbox to review and address with provider.

## 2023-08-03 ENCOUNTER — TELEPHONE (OUTPATIENT)
Dept: CARDIOLOGY | Facility: CLINIC | Age: 78
End: 2023-08-03
Payer: MEDICARE

## 2023-08-29 ENCOUNTER — TELEPHONE (OUTPATIENT)
Dept: CARDIOLOGY | Facility: CLINIC | Age: 78
End: 2023-08-29
Payer: MEDICARE

## 2023-08-29 NOTE — TELEPHONE ENCOUNTER
Received cardiac clearance request from  stating pt has LYNNE scheduled for 09/06/2023 and is requiring a cardiac clearance. Placed cardiac clearance request in Delisa's inbox to review and address with provider.

## 2023-08-29 NOTE — LETTER
August 31, 2023             To Whom It May Concern:    We build our practice on integrity and patient care. The highest compliment we can receive is the referral of friends, family and patients to our office. We want to take this time to thank you for considering our group as part of your care team.    The medical records for Trevor Jeffers 1945 were reviewed for pre-operative clearance on 08/31/23. It has been determined that this patient has: ACCEPTABLE RISK.    Trevor Jeffers is currently on the following medications that will need to be held prior to surgery: CAN HOLD PLAVIX 5-7 DAYS, CONTINUE ASA.    This pre-operative evaluation has been completed based on patient reported medical conditions at the date of this letter, this evaluation may have considered in part results of additional testing, completion of an EKG and patient reported symptoms at the time of their visit.    Trevor Jeffers's pre-operative clearance is good for thirty(30) days from the date of this letter with no reported changes in health, symptoms or diagnosis from the patient, their primary care provider or your office.    Your office has reported the patient will under go FOR LYNNE on 09/06/23 with Dr. MICHAEL. If this appointment is changed or moved outside of thirty(30) days from 08/31/23. this pre-operative clearance is void.    If you have any further questions please give our office a call.    Thank you for your trust,      TEETEE Bell

## 2023-09-06 ENCOUNTER — OFFICE VISIT (OUTPATIENT)
Dept: CARDIOLOGY | Facility: CLINIC | Age: 78
End: 2023-09-06
Payer: MEDICARE

## 2023-09-06 VITALS
BODY MASS INDEX: 31.5 KG/M2 | WEIGHT: 220 LBS | OXYGEN SATURATION: 98 % | HEIGHT: 70 IN | DIASTOLIC BLOOD PRESSURE: 77 MMHG | HEART RATE: 62 BPM | SYSTOLIC BLOOD PRESSURE: 142 MMHG

## 2023-09-06 DIAGNOSIS — I25.10 CORONARY ARTERY DISEASE INVOLVING NATIVE CORONARY ARTERY OF NATIVE HEART WITHOUT ANGINA PECTORIS: Primary | ICD-10-CM

## 2023-09-06 DIAGNOSIS — I44.0 FIRST DEGREE AV BLOCK: ICD-10-CM

## 2023-09-06 DIAGNOSIS — I48.91 ATRIAL FIBRILLATION, UNSPECIFIED TYPE: ICD-10-CM

## 2023-09-06 DIAGNOSIS — I38 VALVULAR HEART DISEASE: ICD-10-CM

## 2023-09-06 PROCEDURE — 99214 OFFICE O/P EST MOD 30 MIN: CPT | Performed by: PHYSICIAN ASSISTANT

## 2023-09-06 PROCEDURE — 3077F SYST BP >= 140 MM HG: CPT | Performed by: PHYSICIAN ASSISTANT

## 2023-09-06 PROCEDURE — 93000 ELECTROCARDIOGRAM COMPLETE: CPT | Performed by: PHYSICIAN ASSISTANT

## 2023-09-06 PROCEDURE — 3078F DIAST BP <80 MM HG: CPT | Performed by: PHYSICIAN ASSISTANT

## 2023-09-06 RX ORDER — CARVEDILOL 6.25 MG/1
6.25 TABLET ORAL 2 TIMES DAILY
Qty: 60 TABLET | Refills: 5 | Status: SHIPPED | OUTPATIENT
Start: 2023-09-06

## 2023-09-06 RX ORDER — AMIODARONE HYDROCHLORIDE 200 MG/1
200 TABLET ORAL DAILY
COMMUNITY
Start: 2023-08-16 | End: 2023-09-06

## 2023-09-06 NOTE — PROGRESS NOTES
Problem list     Subjective   Trevor Jeffers is a 77 y.o. male     Chief Complaint   Patient presents with    Follow-up     6 months   Problem list:     1.  Coronary artery disease  1.1 coronary artery bypass grafting July 2022 at [facility] in [place], inadequate data  2.  Aortic stenosis  2.1 moderate aortic stenosis by last evaluation in [place] 2022  2.2 episode of syncope and hospitalization while in [place] with subsequent aortic valve replacement, left atrial appendage clipping and coronary artery bypass grafting performed in July 2022  2.3 repeat echocardiogram 2022 with stable valve parameters and normal systolic function noted.  3.  Hypertension  4.  Dyslipidemia  5.  Insulin-dependent diabetes mellitus  6.  Postoperative atrial fibrillation  6 1 left atrial appendage clipping during coronary bypass grafting.    HPI    Patient is a 77-year-old male who presents back to the office for routine cardiac follow-up.    Patient has done well.  He does not complain of chest pain or pressure.  He has mild dyspnea.  No complaints of progressive shortness of breath.  No PND or orthopnea.    Patient does not describe palpitating.  Patient can have dizziness upon standing and apparently has had issues with hypotension.  Otherwise, he is stable.      Current Outpatient Medications on File Prior to Visit   Medication Sig Dispense Refill    aspirin 81 MG chewable tablet Chew 1 tablet Daily.      atorvastatin (LIPITOR) 80 MG tablet Take 1 tablet by mouth every night at bedtime.      gabapentin (NEURONTIN) 300 MG capsule Take 1 capsule by mouth 2 (Two) Times a Day.      Lantus SoloStar 100 UNIT/ML injection pen Inject 25 Units under the skin into the appropriate area as directed Daily.      pantoprazole (PROTONIX) 40 MG EC tablet Take 1 tablet by mouth.      traMADol (ULTRAM) 50 MG tablet Take 1 tablet by mouth Every 6 (Six) Hours As Needed for Moderate Pain.      [DISCONTINUED] amiodarone (PACERONE)  200 MG tablet Take 1 tablet by mouth Daily.      [DISCONTINUED] carvedilol (COREG) 6.25 MG tablet Take 1 tablet by mouth 2 (Two) Times a Day With Meals.      [DISCONTINUED] clopidogrel (PLAVIX) 75 MG tablet Take 1 tablet by mouth Daily.      [DISCONTINUED] losartan-hydrochlorothiazide (HYZAAR) 100-25 MG per tablet 0.5 tablets Daily.       No current facility-administered medications on file prior to visit.       Patient has no known allergies.    Past Medical History:   Diagnosis Date    Anemia     Chronic pain     Degenerative arthritis     Diabetes     SPOUSE REPORTS SLIGHTLY ELEVATED GLUCOSE AT LAST APPOINTMENT, TAKING NO MEDS    Dyslipidemia     Heart attack     2022    Hyperlipidemia     Hypertension     Long Standing     Lower extremity edema     Mild, which is self limiting.     Murmur, cardiac     Obesity     s/p lap band surg     Old myocardial infarction     Osteoarthritis     Wears dentures     SPOUSE REPORTS PATIENT WEAR AN UPPER PLATE    Wears glasses        Social History     Socioeconomic History    Marital status:    Tobacco Use    Smoking status: Never    Smokeless tobacco: Never   Vaping Use    Vaping Use: Never used   Substance and Sexual Activity    Alcohol use: No    Drug use: No    Sexual activity: Defer       Family History   Problem Relation Age of Onset    No Known Problems Mother     No Known Problems Father     Colon cancer Neg Hx        Review of Systems   Constitutional:  Negative for activity change, appetite change, chills and fatigue.   HENT: Negative.     Eyes: Negative.  Negative for visual disturbance.   Respiratory:  Positive for shortness of breath. Negative for apnea, cough and wheezing.    Cardiovascular:  Negative for chest pain, palpitations and leg swelling.   Gastrointestinal: Negative.  Negative for blood in stool.   Genitourinary: Negative.  Negative for hematuria.   Musculoskeletal: Negative.    Skin: Negative.  Negative for color change, rash and wound.  "  Neurological:  Positive for dizziness. Negative for syncope, weakness, light-headedness, numbness and headaches.   Hematological: Negative.  Bruises/bleeds easily.   Psychiatric/Behavioral: Negative.  Negative for sleep disturbance.      Objective   Vitals:    09/06/23 1429   BP: 142/77   BP Location: Right arm   Patient Position: Sitting   Cuff Size: Adult   Pulse: 62   SpO2: 98%   Weight: 99.8 kg (220 lb)   Height: 177.8 cm (70\")      /77 (BP Location: Right arm, Patient Position: Sitting, Cuff Size: Adult)   Pulse 62   Ht 177.8 cm (70\")   Wt 99.8 kg (220 lb)   SpO2 98%   BMI 31.57 kg/m²     Lab Results (most recent)       None            Physical Exam  Vitals and nursing note reviewed.   Constitutional:       General: He is not in acute distress.     Appearance: Normal appearance. He is well-developed.   HENT:      Head: Normocephalic and atraumatic.   Eyes:      General: No scleral icterus.        Right eye: No discharge.         Left eye: No discharge.      Conjunctiva/sclera: Conjunctivae normal.   Neck:      Vascular: No carotid bruit.   Cardiovascular:      Rate and Rhythm: Normal rate and regular rhythm.      Heart sounds: Murmur heard.   Systolic murmur is present with a grade of 1/6.     No friction rub. No gallop.   Pulmonary:      Effort: Pulmonary effort is normal. No respiratory distress.      Breath sounds: Normal breath sounds. No wheezing or rales.   Chest:      Chest wall: No tenderness.   Musculoskeletal:      Right lower leg: No edema.      Left lower leg: No edema.   Skin:     General: Skin is warm and dry.      Coloration: Skin is not pale.      Findings: No erythema or rash.   Neurological:      Mental Status: He is alert and oriented to person, place, and time.      Cranial Nerves: No cranial nerve deficit.   Psychiatric:         Behavior: Behavior normal.       Procedure     ECG 12 Lead    Date/Time: 9/6/2023 2:29 PM  Performed by: Kris Mg PA  Authorized by: Saurav, " TEETEE Case   Comparison: compared with previous ECG from 2/1/2022  Comments: EKG demonstrates sinus bradycardia 59 bpm with first-degree AV block at 263 ms, left bundle branch block, with no acute ST changes           Assessment & Plan     Problems Addressed this Visit          Cardiac and Vasculature    Valvular heart disease    Relevant Medications    carvedilol (COREG) 6.25 MG tablet    CAD (coronary artery disease) - Primary    Relevant Medications    carvedilol (COREG) 6.25 MG tablet    Atrial fibrillation    Relevant Medications    carvedilol (COREG) 6.25 MG tablet    First degree AV block    Relevant Medications    carvedilol (COREG) 6.25 MG tablet     Diagnoses         Codes Comments    Coronary artery disease involving native coronary artery of native heart without angina pectoris    -  Primary ICD-10-CM: I25.10  ICD-9-CM: 414.01     Valvular heart disease     ICD-10-CM: I38  ICD-9-CM: 424.90     Atrial fibrillation, unspecified type     ICD-10-CM: I48.91  ICD-9-CM: 427.31     First degree AV block     ICD-10-CM: I44.0  ICD-9-CM: 426.11             Recommendation  1.  Patient is a 77-year-old male who presents to the office to be evaluated.  Patient has history of coronary disease.  Patient has done well.  He does not describe any angina.  For now, he appears to be doing well we will continue aspirin and statin therapy.  His labs have been monitored by primary.    2.  Patient with valvular heart disease and is status post valve replacement with stable valve parameters by repeat echocardiogram.  We can continue to monitor for now.    3.  Patient with a degree of atrial fibrillation postoperatively but had left atrial appendage clipping and has not had significant palpitation issues.  Amiodarone has been discontinued.  I would like to decrease his carvedilol because of his first-degree AV block and occasional hypotensive episodes and orthostatic issues.  Hopefully, he will have improvement but I want him to  monitor blood pressure and symptoms otherwise.    4.  For now, we will see him back for follow-up in 6 months or sooner as symptoms discussed.  Follow-up with primary as scheduled.       Patient did not bring med list or medicine bottles to appointment, med list has been reviewed and updated based on patient's knowledge of their meds.      Advance Care Planning   ACP discussion was declined by the patient. Patient does not have an advance directive, declines further assistance.           Electronically signed by:

## 2023-09-06 NOTE — LETTER
September 6, 2023       No Recipients    Patient: Trevor Jeffers   YOB: 1945   Date of Visit: 9/6/2023       Dear Brian Del Angel MD    Trevor Jeffers was in my office today. Below is a copy of my note.    If you have questions, please do not hesitate to call me. I look forward to following Trevor along with you.         Sincerely,        TEETEE Earl        CC:   No Recipients    Problem list     Subjective  Trevor Jeffers is a 77 y.o. male     Chief Complaint   Patient presents with   • Follow-up     6 months   Problem list:     1.  Coronary artery disease  1.1 coronary artery bypass grafting July 2022 at [facility] in [place], inadequate data  2.  Aortic stenosis  2.1 moderate aortic stenosis by last evaluation in [place] 2022  2.2 episode of syncope and hospitalization while in [place] with subsequent aortic valve replacement, left atrial appendage clipping and coronary artery bypass grafting performed in July 2022  2.3 repeat echocardiogram 2022 with stable valve parameters and normal systolic function noted.  3.  Hypertension  4.  Dyslipidemia  5.  Insulin-dependent diabetes mellitus  6.  Postoperative atrial fibrillation  6 1 left atrial appendage clipping during coronary bypass grafting.    HPI    Patient is a 77-year-old male who presents back to the office for routine cardiac follow-up.    Patient has done well.  He does not complain of chest pain or pressure.  He has mild dyspnea.  No complaints of progressive shortness of breath.  No PND or orthopnea.    Patient does not describe palpitating.  Patient can have dizziness upon standing and apparently has had issues with hypotension.  Otherwise, he is stable.      Current Outpatient Medications on File Prior to Visit   Medication Sig Dispense Refill   • aspirin 81 MG chewable tablet Chew 1 tablet Daily.     • atorvastatin (LIPITOR) 80 MG tablet Take 1 tablet by mouth every night at bedtime.     •  gabapentin (NEURONTIN) 300 MG capsule Take 1 capsule by mouth 2 (Two) Times a Day.     • Lantus SoloStar 100 UNIT/ML injection pen Inject 25 Units under the skin into the appropriate area as directed Daily.     • pantoprazole (PROTONIX) 40 MG EC tablet Take 1 tablet by mouth.     • traMADol (ULTRAM) 50 MG tablet Take 1 tablet by mouth Every 6 (Six) Hours As Needed for Moderate Pain.     • [DISCONTINUED] amiodarone (PACERONE) 200 MG tablet Take 1 tablet by mouth Daily.     • [DISCONTINUED] carvedilol (COREG) 6.25 MG tablet Take 1 tablet by mouth 2 (Two) Times a Day With Meals.     • [DISCONTINUED] clopidogrel (PLAVIX) 75 MG tablet Take 1 tablet by mouth Daily.     • [DISCONTINUED] losartan-hydrochlorothiazide (HYZAAR) 100-25 MG per tablet 0.5 tablets Daily.       No current facility-administered medications on file prior to visit.       Patient has no known allergies.    Past Medical History:   Diagnosis Date   • Anemia    • Chronic pain    • Degenerative arthritis    • Diabetes     SPOUSE REPORTS SLIGHTLY ELEVATED GLUCOSE AT LAST APPOINTMENT, TAKING NO MEDS   • Dyslipidemia    • Heart attack     2022   • Hyperlipidemia    • Hypertension     Long Standing    • Lower extremity edema     Mild, which is self limiting.    • Murmur, cardiac    • Obesity     s/p lap band surg    • Old myocardial infarction    • Osteoarthritis    • Wears dentures     SPOUSE REPORTS PATIENT WEAR AN UPPER PLATE   • Wears glasses        Social History     Socioeconomic History   • Marital status:    Tobacco Use   • Smoking status: Never   • Smokeless tobacco: Never   Vaping Use   • Vaping Use: Never used   Substance and Sexual Activity   • Alcohol use: No   • Drug use: No   • Sexual activity: Defer       Family History   Problem Relation Age of Onset   • No Known Problems Mother    • No Known Problems Father    • Colon cancer Neg Hx        Review of Systems   Constitutional:  Negative for activity change, appetite change, chills and  "fatigue.   HENT: Negative.     Eyes: Negative.  Negative for visual disturbance.   Respiratory:  Positive for shortness of breath. Negative for apnea, cough and wheezing.    Cardiovascular:  Negative for chest pain, palpitations and leg swelling.   Gastrointestinal: Negative.  Negative for blood in stool.   Genitourinary: Negative.  Negative for hematuria.   Musculoskeletal: Negative.    Skin: Negative.  Negative for color change, rash and wound.   Neurological:  Positive for dizziness. Negative for syncope, weakness, light-headedness, numbness and headaches.   Hematological: Negative.  Bruises/bleeds easily.   Psychiatric/Behavioral: Negative.  Negative for sleep disturbance.      Objective  Vitals:    09/06/23 1429   BP: 142/77   BP Location: Right arm   Patient Position: Sitting   Cuff Size: Adult   Pulse: 62   SpO2: 98%   Weight: 99.8 kg (220 lb)   Height: 177.8 cm (70\")      /77 (BP Location: Right arm, Patient Position: Sitting, Cuff Size: Adult)   Pulse 62   Ht 177.8 cm (70\")   Wt 99.8 kg (220 lb)   SpO2 98%   BMI 31.57 kg/m²     Lab Results (most recent)       None            Physical Exam  Vitals and nursing note reviewed.   Constitutional:       General: He is not in acute distress.     Appearance: Normal appearance. He is well-developed.   HENT:      Head: Normocephalic and atraumatic.   Eyes:      General: No scleral icterus.        Right eye: No discharge.         Left eye: No discharge.      Conjunctiva/sclera: Conjunctivae normal.   Neck:      Vascular: No carotid bruit.   Cardiovascular:      Rate and Rhythm: Normal rate and regular rhythm.      Heart sounds: Murmur heard.   Systolic murmur is present with a grade of 1/6.     No friction rub. No gallop.   Pulmonary:      Effort: Pulmonary effort is normal. No respiratory distress.      Breath sounds: Normal breath sounds. No wheezing or rales.   Chest:      Chest wall: No tenderness.   Musculoskeletal:      Right lower leg: No edema.      " Left lower leg: No edema.   Skin:     General: Skin is warm and dry.      Coloration: Skin is not pale.      Findings: No erythema or rash.   Neurological:      Mental Status: He is alert and oriented to person, place, and time.      Cranial Nerves: No cranial nerve deficit.   Psychiatric:         Behavior: Behavior normal.       Procedure    ECG 12 Lead    Date/Time: 9/6/2023 2:29 PM  Performed by: Kris Mg PA  Authorized by: Kris Mg PA   Comparison: compared with previous ECG from 2/1/2022  Comments: EKG demonstrates sinus bradycardia 59 bpm with first-degree AV block at 263 ms, left bundle branch block, with no acute ST changes           Assessment & Plan    Problems Addressed this Visit          Cardiac and Vasculature    Valvular heart disease    Relevant Medications    carvedilol (COREG) 6.25 MG tablet    CAD (coronary artery disease) - Primary    Relevant Medications    carvedilol (COREG) 6.25 MG tablet    Atrial fibrillation    Relevant Medications    carvedilol (COREG) 6.25 MG tablet    First degree AV block    Relevant Medications    carvedilol (COREG) 6.25 MG tablet     Diagnoses         Codes Comments    Coronary artery disease involving native coronary artery of native heart without angina pectoris    -  Primary ICD-10-CM: I25.10  ICD-9-CM: 414.01     Valvular heart disease     ICD-10-CM: I38  ICD-9-CM: 424.90     Atrial fibrillation, unspecified type     ICD-10-CM: I48.91  ICD-9-CM: 427.31     First degree AV block     ICD-10-CM: I44.0  ICD-9-CM: 426.11             Recommendation  1.  Patient is a 77-year-old male who presents to the office to be evaluated.  Patient has history of coronary disease.  Patient has done well.  He does not describe any angina.  For now, he appears to be doing well we will continue aspirin and statin therapy.  His labs have been monitored by primary.    2.  Patient with valvular heart disease and is status post valve replacement with stable valve  parameters by repeat echocardiogram.  We can continue to monitor for now.    3.  Patient with a degree of atrial fibrillation postoperatively but had left atrial appendage clipping and has not had significant palpitation issues.  Amiodarone has been discontinued.  I would like to decrease his carvedilol because of his first-degree AV block and occasional hypotensive episodes and orthostatic issues.  Hopefully, he will have improvement but I want him to monitor blood pressure and symptoms otherwise.    4.  For now, we will see him back for follow-up in 6 months or sooner as symptoms discussed.  Follow-up with primary as scheduled.       Patient did not bring med list or medicine bottles to appointment, med list has been reviewed and updated based on patient's knowledge of their meds.      Advance Care Planning   ACP discussion was declined by the patient. Patient does not have an advance directive, declines further assistance.           Electronically signed by:

## 2024-04-03 ENCOUNTER — OFFICE VISIT (OUTPATIENT)
Dept: CARDIOLOGY | Facility: CLINIC | Age: 79
End: 2024-04-03
Payer: MEDICARE

## 2024-04-03 VITALS
HEIGHT: 70 IN | DIASTOLIC BLOOD PRESSURE: 68 MMHG | HEART RATE: 60 BPM | OXYGEN SATURATION: 96 % | SYSTOLIC BLOOD PRESSURE: 132 MMHG | BODY MASS INDEX: 33.76 KG/M2 | WEIGHT: 235.8 LBS

## 2024-04-03 DIAGNOSIS — I35.9 AORTIC VALVE DISEASE: ICD-10-CM

## 2024-04-03 DIAGNOSIS — I25.10 CORONARY ARTERY DISEASE INVOLVING NATIVE CORONARY ARTERY OF NATIVE HEART WITHOUT ANGINA PECTORIS: Primary | ICD-10-CM

## 2024-04-03 DIAGNOSIS — I10 PRIMARY HYPERTENSION: ICD-10-CM

## 2024-04-03 PROCEDURE — 3078F DIAST BP <80 MM HG: CPT | Performed by: PHYSICIAN ASSISTANT

## 2024-04-03 PROCEDURE — 99214 OFFICE O/P EST MOD 30 MIN: CPT | Performed by: PHYSICIAN ASSISTANT

## 2024-04-03 PROCEDURE — 3075F SYST BP GE 130 - 139MM HG: CPT | Performed by: PHYSICIAN ASSISTANT

## 2024-04-03 RX ORDER — CARVEDILOL 25 MG/1
25 TABLET ORAL 2 TIMES DAILY WITH MEALS
COMMUNITY

## 2024-04-03 RX ORDER — VALSARTAN AND HYDROCHLOROTHIAZIDE 160; 25 MG/1; MG/1
1 TABLET ORAL DAILY
COMMUNITY
Start: 2024-03-04

## 2024-04-03 NOTE — PROGRESS NOTES
Problem list     Subjective   Trevor Jeffers is a 78 y.o. male     Chief Complaint   Patient presents with    Follow-up     6 month f/u    Problem list:     1.  Coronary artery disease  1.1 coronary artery bypass grafting July 2022 at [facility] in [place], inadequate data  2.  Aortic stenosis  2.1 moderate aortic stenosis by last evaluation in [place] 2022  2.2 episode of syncope and hospitalization while in [place] with subsequent aortic valve replacement, left atrial appendage clipping and coronary artery bypass grafting performed in July 2022  3.  Hypertension  4.  Dyslipidemia  5.  Insulin-dependent diabetes mellitus  6.  Postoperative atrial fibrillation  6 1 left atrial appendage clipping during coronary bypass grafting.       HPI    Patient is a 78-year-old male that presents back to the office for routine cardiac assessment.  He has known coronary disease with history of bypass grafting and aortic valve replacement.    He feels remarkably well.  He does not have any chest pain or pressure.  No complaints of dyspnea.  No PND or orthopnea.    He does not describe palpitating.  No complaints of dizziness, presyncope or syncope.    Patient was on carvedilol 25 mg twice a day and last visit, I decreased to 6.25 twice a day because of significant first-degree AV block.  His amiodarone was discontinued in the past as well.  It appears that this was increased back to 25 mg daily.  He feels well on that medication.  He is stable otherwise.      Current Outpatient Medications on File Prior to Visit   Medication Sig Dispense Refill    aspirin 81 MG chewable tablet Chew 1 tablet Daily.      atorvastatin (LIPITOR) 80 MG tablet Take 1 tablet by mouth every night at bedtime.      carvedilol (COREG) 25 MG tablet Take 1 tablet by mouth 2 (Two) Times a Day With Meals.      gabapentin (NEURONTIN) 300 MG capsule Take 1 capsule by mouth 2 (Two) Times a Day.      Lantus SoloStar 100 UNIT/ML injection pen  Inject 40 Units under the skin into the appropriate area as directed Daily.      metFORMIN (GLUCOPHAGE) 500 MG tablet Take 1 tablet by mouth 2 (Two) Times a Day With Meals.      pantoprazole (PROTONIX) 40 MG EC tablet Take 1 tablet by mouth.      traMADol (ULTRAM) 50 MG tablet Take 1 tablet by mouth Every 6 (Six) Hours As Needed for Moderate Pain.      valsartan-hydrochlorothiazide (DIOVAN-HCT) 160-25 MG per tablet Take 1 tablet by mouth Daily.      [DISCONTINUED] carvedilol (COREG) 6.25 MG tablet Take 1 tablet by mouth 2 (Two) Times a Day. 60 tablet 5     No current facility-administered medications on file prior to visit.       Patient has no known allergies.    Past Medical History:   Diagnosis Date    Anemia     Aortic valve replaced     Chronic pain     Coronary artery disease     Degenerative arthritis     Diabetes     SPOUSE REPORTS SLIGHTLY ELEVATED GLUCOSE AT LAST APPOINTMENT, TAKING NO MEDS    Dyslipidemia     Heart attack     2022    Hyperlipidemia     Hypertension     Long Standing     Lower extremity edema     Mild, which is self limiting.     Murmur, cardiac     Obesity     s/p lap band surg     Old myocardial infarction     Osteoarthritis     Wears dentures     SPOUSE REPORTS PATIENT WEAR AN UPPER PLATE    Wears glasses        Social History     Socioeconomic History    Marital status:    Tobacco Use    Smoking status: Never    Smokeless tobacco: Never   Vaping Use    Vaping status: Never Used   Substance and Sexual Activity    Alcohol use: No    Drug use: No    Sexual activity: Defer       Family History   Problem Relation Age of Onset    No Known Problems Mother     Hypertension Father     Colon cancer Neg Hx        Review of Systems   Constitutional:  Positive for diaphoresis (last night). Negative for chills, fatigue and fever.   HENT: Negative.          Voice is getting deeper maybe related to thyroid  Getting a thyroid biopsy   Eyes: Negative.  Negative for visual disturbance (glasses).  "  Respiratory: Negative.  Negative for apnea, cough, chest tightness, shortness of breath and wheezing.    Cardiovascular: Negative.  Negative for chest pain, palpitations and leg swelling.   Gastrointestinal: Negative.  Negative for blood in stool.   Endocrine: Negative.  Negative for cold intolerance and heat intolerance.   Genitourinary: Negative.  Negative for hematuria.   Musculoskeletal:  Positive for arthralgias, back pain, neck pain and neck stiffness. Negative for myalgias.   Skin: Negative.  Negative for color change, rash and wound.   Allergic/Immunologic: Negative.  Negative for environmental allergies and food allergies.   Neurological:  Positive for dizziness (occas. after taking medications in the morning and sometimes standing up) and light-headedness. Negative for syncope, weakness, numbness and headaches.   Hematological: Negative.  Does not bruise/bleed easily.   Psychiatric/Behavioral: Negative.  Negative for sleep disturbance.        Objective   Vitals:    04/03/24 1303   BP: 132/68   BP Location: Left arm   Patient Position: Sitting   Pulse: 60   SpO2: 96%   Weight: 107 kg (235 lb 12.8 oz)   Height: 177.8 cm (70\")      /68 (BP Location: Left arm, Patient Position: Sitting)   Pulse 60   Ht 177.8 cm (70\")   Wt 107 kg (235 lb 12.8 oz)   SpO2 96%   BMI 33.83 kg/m²     Lab Results (most recent)       None            Physical Exam  Vitals and nursing note reviewed.   Constitutional:       General: He is not in acute distress.     Appearance: Normal appearance. He is well-developed.   HENT:      Head: Normocephalic and atraumatic.   Eyes:      General: No scleral icterus.        Right eye: No discharge.         Left eye: No discharge.      Conjunctiva/sclera: Conjunctivae normal.   Neck:      Vascular: No carotid bruit.   Cardiovascular:      Rate and Rhythm: Normal rate and regular rhythm.      Heart sounds: Murmur heard.      Systolic murmur is present with a grade of 1/6.      No " friction rub. No gallop.      Comments: Grade 1/6 systolic ejection murmur right upper sternal border  Pulmonary:      Effort: Pulmonary effort is normal. No respiratory distress.      Breath sounds: Normal breath sounds. No wheezing or rales.   Chest:      Chest wall: No tenderness.   Musculoskeletal:      Right lower leg: No edema.      Left lower leg: No edema.   Skin:     General: Skin is warm and dry.      Coloration: Skin is not pale.      Findings: No erythema or rash.   Neurological:      Mental Status: He is alert and oriented to person, place, and time.      Cranial Nerves: No cranial nerve deficit.   Psychiatric:         Behavior: Behavior normal.         Procedure   Procedures       Assessment & Plan     Problems Addressed this Visit          Cardiac and Vasculature    Aortic valve disease    Relevant Medications    carvedilol (COREG) 25 MG tablet    Primary hypertension    Relevant Medications    carvedilol (COREG) 25 MG tablet    valsartan-hydrochlorothiazide (DIOVAN-HCT) 160-25 MG per tablet    CAD (coronary artery disease) - Primary    Relevant Medications    carvedilol (COREG) 25 MG tablet     Diagnoses         Codes Comments    Coronary artery disease involving native coronary artery of native heart without angina pectoris    -  Primary ICD-10-CM: I25.10  ICD-9-CM: 414.01     Aortic valve disease     ICD-10-CM: I35.9  ICD-9-CM: 424.1     Primary hypertension     ICD-10-CM: I10  ICD-9-CM: 401.9             Recommendation  1.  Patient is a 78-year-old male presenting back to the office for evaluation.  Patient with known coronary disease doing remarkably well.  Patient does not experience angina.  He feels well.  For now, we will continue medical therapy.    2.  Patient with baseline hypertension doing well.  We can can routinely monitor with EKG.  In the future, we may have to consider decreasing carvedilol depending on his NV interval.  We could always add amlodipine to help with blood pressure  management or even have him take valsartan twice a day instead of once a day.  For now, he is doing well.  AZ interval of the last EKG was at 263 ms.  We can monitor for now.    3.  Patient with aortic valve disease status post replacement with last echocardiogram showing stable parameters.  Physical exam does not demonstrate any acute abnormalities or findings to suggest worsening valve disease.  We can monitor.    4.  Patient has routine labs followed by primary.  We recommend an LDL goal less than 70.  Otherwise, we will see him back for follow-up in 6 months or sooner if needed.  Follow-up with primary as scheduled.           Trevor Jeffers  reports that he has never smoked. He has never used smokeless tobacco.    Pt brought in Medlist that was not updated to visit.    Advance Care Planning   ACP discussion was declined by the patient. Patient does not have an advance directive, declines further assistance.       Electronically signed by:

## 2024-04-03 NOTE — LETTER
April 3, 2024       No Recipients    Patient: Trevor Jeffers   YOB: 1945   Date of Visit: 4/3/2024       Dear Brian Del Angel MD    Trevor Jeffers was in my office today. Below is a copy of my note.    If you have questions, please do not hesitate to call me. I look forward to following Trevor along with you.         Sincerely,        TEETEE Earl        CC:   No Recipients    Problem list     Subjective  Trevor Jeffers is a 78 y.o. male     Chief Complaint   Patient presents with   • Follow-up     6 month f/u    Problem list:     1.  Coronary artery disease  1.1 coronary artery bypass grafting July 2022 at [facility] in [place], inadequate data  2.  Aortic stenosis  2.1 moderate aortic stenosis by last evaluation in [place] 2022  2.2 episode of syncope and hospitalization while in [place] with subsequent aortic valve replacement, left atrial appendage clipping and coronary artery bypass grafting performed in July 2022  3.  Hypertension  4.  Dyslipidemia  5.  Insulin-dependent diabetes mellitus  6.  Postoperative atrial fibrillation  6 1 left atrial appendage clipping during coronary bypass grafting.       HPI    Patient is a 78-year-old male that presents back to the office for routine cardiac assessment.  He has known coronary disease with history of bypass grafting and aortic valve replacement.    He feels remarkably well.  He does not have any chest pain or pressure.  No complaints of dyspnea.  No PND or orthopnea.    He does not describe palpitating.  No complaints of dizziness, presyncope or syncope.    Patient was on carvedilol 25 mg twice a day and last visit, I decreased to 6.25 twice a day because of significant first-degree AV block.  His amiodarone was discontinued in the past as well.  It appears that this was increased back to 25 mg daily.  He feels well on that medication.  He is stable otherwise.      Current Outpatient Medications on File  Prior to Visit   Medication Sig Dispense Refill   • aspirin 81 MG chewable tablet Chew 1 tablet Daily.     • atorvastatin (LIPITOR) 80 MG tablet Take 1 tablet by mouth every night at bedtime.     • carvedilol (COREG) 25 MG tablet Take 1 tablet by mouth 2 (Two) Times a Day With Meals.     • gabapentin (NEURONTIN) 300 MG capsule Take 1 capsule by mouth 2 (Two) Times a Day.     • Lantus SoloStar 100 UNIT/ML injection pen Inject 40 Units under the skin into the appropriate area as directed Daily.     • metFORMIN (GLUCOPHAGE) 500 MG tablet Take 1 tablet by mouth 2 (Two) Times a Day With Meals.     • pantoprazole (PROTONIX) 40 MG EC tablet Take 1 tablet by mouth.     • traMADol (ULTRAM) 50 MG tablet Take 1 tablet by mouth Every 6 (Six) Hours As Needed for Moderate Pain.     • valsartan-hydrochlorothiazide (DIOVAN-HCT) 160-25 MG per tablet Take 1 tablet by mouth Daily.     • [DISCONTINUED] carvedilol (COREG) 6.25 MG tablet Take 1 tablet by mouth 2 (Two) Times a Day. 60 tablet 5     No current facility-administered medications on file prior to visit.       Patient has no known allergies.    Past Medical History:   Diagnosis Date   • Anemia    • Aortic valve replaced    • Chronic pain    • Coronary artery disease    • Degenerative arthritis    • Diabetes     SPOUSE REPORTS SLIGHTLY ELEVATED GLUCOSE AT LAST APPOINTMENT, TAKING NO MEDS   • Dyslipidemia    • Heart attack     2022   • Hyperlipidemia    • Hypertension     Long Standing    • Lower extremity edema     Mild, which is self limiting.    • Murmur, cardiac    • Obesity     s/p lap band surg    • Old myocardial infarction    • Osteoarthritis    • Wears dentures     SPOUSE REPORTS PATIENT WEAR AN UPPER PLATE   • Wears glasses        Social History     Socioeconomic History   • Marital status:    Tobacco Use   • Smoking status: Never   • Smokeless tobacco: Never   Vaping Use   • Vaping status: Never Used   Substance and Sexual Activity   • Alcohol use: No   • Drug  "use: No   • Sexual activity: Defer       Family History   Problem Relation Age of Onset   • No Known Problems Mother    • Hypertension Father    • Colon cancer Neg Hx        Review of Systems   Constitutional:  Positive for diaphoresis (last night). Negative for chills, fatigue and fever.   HENT: Negative.          Voice is getting deeper maybe related to thyroid  Getting a thyroid biopsy   Eyes: Negative.  Negative for visual disturbance (glasses).   Respiratory: Negative.  Negative for apnea, cough, chest tightness, shortness of breath and wheezing.    Cardiovascular: Negative.  Negative for chest pain, palpitations and leg swelling.   Gastrointestinal: Negative.  Negative for blood in stool.   Endocrine: Negative.  Negative for cold intolerance and heat intolerance.   Genitourinary: Negative.  Negative for hematuria.   Musculoskeletal:  Positive for arthralgias, back pain, neck pain and neck stiffness. Negative for myalgias.   Skin: Negative.  Negative for color change, rash and wound.   Allergic/Immunologic: Negative.  Negative for environmental allergies and food allergies.   Neurological:  Positive for dizziness (occas. after taking medications in the morning and sometimes standing up) and light-headedness. Negative for syncope, weakness, numbness and headaches.   Hematological: Negative.  Does not bruise/bleed easily.   Psychiatric/Behavioral: Negative.  Negative for sleep disturbance.        Objective  Vitals:    04/03/24 1303   BP: 132/68   BP Location: Left arm   Patient Position: Sitting   Pulse: 60   SpO2: 96%   Weight: 107 kg (235 lb 12.8 oz)   Height: 177.8 cm (70\")      /68 (BP Location: Left arm, Patient Position: Sitting)   Pulse 60   Ht 177.8 cm (70\")   Wt 107 kg (235 lb 12.8 oz)   SpO2 96%   BMI 33.83 kg/m²     Lab Results (most recent)       None            Physical Exam  Vitals and nursing note reviewed.   Constitutional:       General: He is not in acute distress.     Appearance: " Normal appearance. He is well-developed.   HENT:      Head: Normocephalic and atraumatic.   Eyes:      General: No scleral icterus.        Right eye: No discharge.         Left eye: No discharge.      Conjunctiva/sclera: Conjunctivae normal.   Neck:      Vascular: No carotid bruit.   Cardiovascular:      Rate and Rhythm: Normal rate and regular rhythm.      Heart sounds: Murmur heard.      Systolic murmur is present with a grade of 1/6.      No friction rub. No gallop.      Comments: Grade 1/6 systolic ejection murmur right upper sternal border  Pulmonary:      Effort: Pulmonary effort is normal. No respiratory distress.      Breath sounds: Normal breath sounds. No wheezing or rales.   Chest:      Chest wall: No tenderness.   Musculoskeletal:      Right lower leg: No edema.      Left lower leg: No edema.   Skin:     General: Skin is warm and dry.      Coloration: Skin is not pale.      Findings: No erythema or rash.   Neurological:      Mental Status: He is alert and oriented to person, place, and time.      Cranial Nerves: No cranial nerve deficit.   Psychiatric:         Behavior: Behavior normal.         Procedure  Procedures       Assessment & Plan    Problems Addressed this Visit          Cardiac and Vasculature    Aortic valve disease    Relevant Medications    carvedilol (COREG) 25 MG tablet    Primary hypertension    Relevant Medications    carvedilol (COREG) 25 MG tablet    valsartan-hydrochlorothiazide (DIOVAN-HCT) 160-25 MG per tablet    CAD (coronary artery disease) - Primary    Relevant Medications    carvedilol (COREG) 25 MG tablet     Diagnoses         Codes Comments    Coronary artery disease involving native coronary artery of native heart without angina pectoris    -  Primary ICD-10-CM: I25.10  ICD-9-CM: 414.01     Aortic valve disease     ICD-10-CM: I35.9  ICD-9-CM: 424.1     Primary hypertension     ICD-10-CM: I10  ICD-9-CM: 401.9             Recommendation  1.  Patient is a 78-year-old male  presenting back to the office for evaluation.  Patient with known coronary disease doing remarkably well.  Patient does not experience angina.  He feels well.  For now, we will continue medical therapy.    2.  Patient with baseline hypertension doing well.  We can can routinely monitor with EKG.  In the future, we may have to consider decreasing carvedilol depending on his LA interval.  We could always add amlodipine to help with blood pressure management or even have him take valsartan twice a day instead of once a day.  For now, he is doing well.  LA interval of the last EKG was at 263 ms.  We can monitor for now.    3.  Patient with aortic valve disease status post replacement with last echocardiogram showing stable parameters.  Physical exam does not demonstrate any acute abnormalities or findings to suggest worsening valve disease.  We can monitor.    4.  Patient has routine labs followed by primary.  We recommend an LDL goal less than 70.  Otherwise, we will see him back for follow-up in 6 months or sooner if needed.  Follow-up with primary as scheduled.           Trevor Jeffers  reports that he has never smoked. He has never used smokeless tobacco.    Pt brought in Medlist that was not updated to visit.    Advance Care Planning  ACP discussion was declined by the patient. Patient does not have an advance directive, declines further assistance.       Electronically signed by:

## 2024-06-05 ENCOUNTER — TELEPHONE (OUTPATIENT)
Dept: CARDIOLOGY | Facility: CLINIC | Age: 79
End: 2024-06-05
Payer: MEDICARE

## 2024-06-05 NOTE — TELEPHONE ENCOUNTER
REQUEST FOR CARDIAC CLEARANCE    Caller name: Trevor Jeffers     Phone Number: [phone]    Surgeon's name: DR. DEMETRICE EDWARDS    Type of planned surgery: THYROID REMOVAL    Date of planned surgery: 6.27.2024    Type of anesthesia: UNKNOWN    Have you been experiencing chest pain or shortness of breath? NO    Is your doctor requesting for you to stop any of your medications prior to your surgery? NONE    Where should we fax the clearance to? [phone]

## 2024-06-06 ENCOUNTER — TELEPHONE (OUTPATIENT)
Dept: CARDIOLOGY | Facility: CLINIC | Age: 79
End: 2024-06-06
Payer: MEDICARE

## 2024-07-01 ENCOUNTER — TELEPHONE (OUTPATIENT)
Dept: CARDIOLOGY | Facility: CLINIC | Age: 79
End: 2024-07-01

## 2024-07-01 NOTE — TELEPHONE ENCOUNTER
Caller: DR. LEOS OFFICE    Relationship: Provider    Best call back number: [phone]    What form or medical record are you requesting: CC REQUEST FOR PT'S THYROIDECTOMY     Who is requesting this form or medical record from you: DR. LEOS OFFICE    How would you like to receive the form or medical records (pick-up, mail, fax): FAX  If fax, what is the fax number: [phone]      Timeframe paperwork needed: ASAP

## 2024-08-02 NOTE — TELEPHONE ENCOUNTER
Received cardiac clearance request from DR DEMETRICE LEOS stating pt has TOTAL THYROIDECTOMY scheduled for 06/24/2024 and is requiring a cardiac clearance. Placed cardiac clearance request in GIULIANO'S inbox to review and address with provider.      [Reviewed Clinical Lab Test(s)] : Results of clinical tests were reviewed. [Reviewed Radiology Report(s)] : Radiology reports were reviewed. [Discuss Alternatives/Risks/Benefits w/Patient] : All alternatives, risks, and benefits were discussed with the patient/family and all questions were answered.  Patient expressed good understanding and appreciates the importance of follow up as recommended. [Pre Op] : The differential diagnosis was discussed in detail. The indications, risks, benefits and alternatives were discussed. [unfilled] expressed an understanding of the treatment rationale and her questions were answered to her apparent satisfaction. [FreeTextEntry2] : Small endometrial polyps, asymptomatic in a postmenopausal female:  likely benign [FreeTextEntry1] : - Unremarkbale GYN exam - repeat us in 1 year  - Follow up in office in 1 year or prn.  cotesting done today  Due for bone density - will let me know when she is recovered after surgery and I will order

## 2024-10-03 ENCOUNTER — OFFICE VISIT (OUTPATIENT)
Dept: CARDIOLOGY | Facility: CLINIC | Age: 79
End: 2024-10-03
Payer: MEDICARE

## 2024-10-03 VITALS
HEIGHT: 70 IN | BODY MASS INDEX: 34.65 KG/M2 | OXYGEN SATURATION: 98 % | SYSTOLIC BLOOD PRESSURE: 139 MMHG | WEIGHT: 242 LBS | HEART RATE: 65 BPM | DIASTOLIC BLOOD PRESSURE: 76 MMHG

## 2024-10-03 DIAGNOSIS — I10 PRIMARY HYPERTENSION: ICD-10-CM

## 2024-10-03 DIAGNOSIS — I35.9 AORTIC VALVE DISEASE: ICD-10-CM

## 2024-10-03 DIAGNOSIS — I25.10 CORONARY ARTERY DISEASE INVOLVING NATIVE CORONARY ARTERY OF NATIVE HEART WITHOUT ANGINA PECTORIS: Primary | ICD-10-CM

## 2024-10-03 PROCEDURE — 99213 OFFICE O/P EST LOW 20 MIN: CPT | Performed by: PHYSICIAN ASSISTANT

## 2024-10-03 PROCEDURE — 3078F DIAST BP <80 MM HG: CPT | Performed by: PHYSICIAN ASSISTANT

## 2024-10-03 PROCEDURE — 3075F SYST BP GE 130 - 139MM HG: CPT | Performed by: PHYSICIAN ASSISTANT

## 2024-10-03 NOTE — PROGRESS NOTES
Problem list     Subjective   Trevor Jeffers is a 79 y.o. male     Chief Complaint   Patient presents with    6 month follow up     CAD   Problem list:     1.  Coronary artery disease  1.1 coronary artery bypass grafting July 2022 at [facility] in [place], inadequate data  2.  Aortic stenosis  2.1 moderate aortic stenosis by last evaluation in [place] 2022  2.2 episode of syncope and hospitalization while in [place] with subsequent aortic valve replacement, left atrial appendage clipping and coronary artery bypass grafting performed in July 2022  3.  Hypertension  4.  Dyslipidemia  5.  Insulin-dependent diabetes mellitus  6.  Postoperative atrial fibrillation  6 1 left atrial appendage clipping during coronary bypass grafting.       HPI    Patient is a 79-year-old male presenting back to the office for routine cardiac assessment.  Overall, he has done well.  He has history of bypass grafting and aortic valve replacement in the past.    Patient has done remarkably well without any chest pain.  He does not complain of any shortness of breath.  No PND or orthopnea.    He does not describe palpitating nor does he complain of dysrhythmic symptoms.  His functional status appears to be stable and he has done well.      Current Outpatient Medications on File Prior to Visit   Medication Sig Dispense Refill    aspirin 81 MG chewable tablet Chew 1 tablet Daily.      atorvastatin (LIPITOR) 80 MG tablet Take 1 tablet by mouth every night at bedtime.      carvedilol (COREG) 25 MG tablet Take 1 tablet by mouth Daily.      gabapentin (NEURONTIN) 300 MG capsule Take 1 capsule by mouth 2 (Two) Times a Day.      Lantus SoloStar 100 UNIT/ML injection pen Inject 40 Units under the skin into the appropriate area as directed Daily.      metFORMIN (GLUCOPHAGE) 500 MG tablet Take 1 tablet by mouth 2 (Two) Times a Day With Meals.      pantoprazole (PROTONIX) 40 MG EC tablet Take 1 tablet by mouth.      traMADol (ULTRAM)  50 MG tablet Take 1 tablet by mouth Every 6 (Six) Hours As Needed for Moderate Pain.      valsartan-hydrochlorothiazide (DIOVAN-HCT) 160-25 MG per tablet Take 1 tablet by mouth Daily.       No current facility-administered medications on file prior to visit.       Patient has no known allergies.    Past Medical History:   Diagnosis Date    Anemia     Aortic valve replaced     Chronic pain     Coronary artery disease     Degenerative arthritis     Diabetes     SPOUSE REPORTS SLIGHTLY ELEVATED GLUCOSE AT LAST APPOINTMENT, TAKING NO MEDS    Dyslipidemia     Heart attack     2022    Hyperlipidemia     Hypertension     Long Standing     Lower extremity edema     Mild, which is self limiting.     Murmur, cardiac     Obesity     s/p lap band surg     Old myocardial infarction     Osteoarthritis     Wears dentures     SPOUSE REPORTS PATIENT WEAR AN UPPER PLATE    Wears glasses        Social History     Socioeconomic History    Marital status:    Tobacco Use    Smoking status: Never    Smokeless tobacco: Never   Vaping Use    Vaping status: Never Used   Substance and Sexual Activity    Alcohol use: No    Drug use: No    Sexual activity: Defer       Family History   Problem Relation Age of Onset    No Known Problems Mother     Hypertension Father     Colon cancer Neg Hx        Review of Systems   Constitutional: Negative.  Negative for activity change, appetite change, chills and fever.   HENT: Negative.  Negative for congestion, sinus pressure and sinus pain.    Eyes: Negative.  Negative for visual disturbance.   Respiratory:  Negative for apnea, cough, chest tightness, shortness of breath and wheezing.    Cardiovascular: Negative.  Negative for chest pain, palpitations and leg swelling.   Gastrointestinal: Negative.  Negative for blood in stool.   Endocrine: Negative.  Negative for cold intolerance and heat intolerance.   Genitourinary: Negative.  Negative for hematuria.   Musculoskeletal: Negative.  Negative for  "gait problem.   Skin: Negative.  Negative for color change, rash and wound.   Allergic/Immunologic: Negative.  Negative for environmental allergies and food allergies.   Neurological: Negative.  Negative for dizziness, syncope, weakness, light-headedness, numbness and headaches.   Hematological:  Does not bruise/bleed easily.   Psychiatric/Behavioral: Negative.  Negative for sleep disturbance.        Objective   Vitals:    10/03/24 1325   BP: 139/76   BP Location: Right arm   Patient Position: Sitting   Cuff Size: Adult   Pulse: 65   SpO2: 98%   Weight: 110 kg (242 lb)   Height: 177.8 cm (70\")      /76 (BP Location: Right arm, Patient Position: Sitting, Cuff Size: Adult)   Pulse 65   Ht 177.8 cm (70\")   Wt 110 kg (242 lb)   SpO2 98%   BMI 34.72 kg/m²     Lab Results (most recent)       None            Physical Exam  Vitals and nursing note reviewed.   Constitutional:       General: He is not in acute distress.     Appearance: Normal appearance. He is well-developed.   HENT:      Head: Normocephalic and atraumatic.   Eyes:      General: No scleral icterus.        Right eye: No discharge.         Left eye: No discharge.      Conjunctiva/sclera: Conjunctivae normal.   Neck:      Vascular: No carotid bruit.   Cardiovascular:      Rate and Rhythm: Normal rate and regular rhythm.      Heart sounds: Murmur heard.      Systolic murmur is present with a grade of 1/6.      No friction rub. No gallop.      Comments: Grade 1/6 systolic ejection murmur right upper sternal border  Pulmonary:      Effort: Pulmonary effort is normal. No respiratory distress.      Breath sounds: Normal breath sounds. No wheezing or rales.   Chest:      Chest wall: No tenderness.   Musculoskeletal:      Right lower leg: No edema.      Left lower leg: No edema.   Skin:     General: Skin is warm and dry.      Coloration: Skin is not pale.      Findings: No erythema or rash.   Neurological:      Mental Status: He is alert and oriented to " person, place, and time.      Cranial Nerves: No cranial nerve deficit.   Psychiatric:         Behavior: Behavior normal.         Procedure   Procedures       Assessment & Plan     Problems Addressed this Visit          Cardiac and Vasculature    Aortic valve disease    Primary hypertension    CAD (coronary artery disease) - Primary     Diagnoses         Codes Comments    Coronary artery disease involving native coronary artery of native heart without angina pectoris    -  Primary ICD-10-CM: I25.10  ICD-9-CM: 414.01     Aortic valve disease     ICD-10-CM: I35.9  ICD-9-CM: 424.1     Primary hypertension     ICD-10-CM: I10  ICD-9-CM: 401.9             Recommendations  1.  Patient is a 79-year-old male presenting for routine assessment.  He has known coronary disease with no angina.  He feels remarkably well on antiplatelet and statin therapy.  He is on high intensity statin.  We recommend an LDL goal less than 70 and he has labs performed by primary.  We will monitor for now.    2.  Patient with aortic valve disease with most recent echo showing stable valve parameters.  I will make no changes.    3.  Patient with baseline hypertension doing well on medical therapy.  For now, we will make no changes.    4.  In conclusion, patient feels remarkably well.  He does not have any angina or failure symptoms on appropriate medical therapy.  For now, we will see patient back for follow-up in 6 months or sooner if needed.  Follow-up with primary as scheduled.         Patient did not bring med list or medicine bottles to appointment, med list has been reviewed and updated based on patient's knowledge of their meds.      Advance Care Planning   ACP discussion was declined by the patient. Patient does not have an advance directive, declines further assistance.      Electronically signed by:

## 2024-10-03 NOTE — LETTER
October 3, 2024       No Recipients    Patient: Trevor Jeffers   YOB: 1945   Date of Visit: 10/3/2024       Dear Brian Del Angel MD    Trevor Jeffers was in my office today. Below is a copy of my note.    If you have questions, please do not hesitate to call me. I look forward to following Trevor along with you.         Sincerely,        TEETEE Earl        CC:   No Recipients    Problem list     Subjective  Trevor Jeffers is a 79 y.o. male     Chief Complaint   Patient presents with   • 6 month follow up     CAD   Problem list:     1.  Coronary artery disease  1.1 coronary artery bypass grafting July 2022 at [facility] in [place], inadequate data  2.  Aortic stenosis  2.1 moderate aortic stenosis by last evaluation in [place] 2022  2.2 episode of syncope and hospitalization while in [place] with subsequent aortic valve replacement, left atrial appendage clipping and coronary artery bypass grafting performed in July 2022  3.  Hypertension  4.  Dyslipidemia  5.  Insulin-dependent diabetes mellitus  6.  Postoperative atrial fibrillation  6 1 left atrial appendage clipping during coronary bypass grafting.       HPI    Patient is a 79-year-old male presenting back to the office for routine cardiac assessment.  Overall, he has done well.  He has history of bypass grafting and aortic valve replacement in the past.    Patient has done remarkably well without any chest pain.  He does not complain of any shortness of breath.  No PND or orthopnea.    He does not describe palpitating nor does he complain of dysrhythmic symptoms.  His functional status appears to be stable and he has done well.      Current Outpatient Medications on File Prior to Visit   Medication Sig Dispense Refill   • aspirin 81 MG chewable tablet Chew 1 tablet Daily.     • atorvastatin (LIPITOR) 80 MG tablet Take 1 tablet by mouth every night at bedtime.     • carvedilol (COREG) 25 MG tablet Take 1  tablet by mouth Daily.     • gabapentin (NEURONTIN) 300 MG capsule Take 1 capsule by mouth 2 (Two) Times a Day.     • Lantus SoloStar 100 UNIT/ML injection pen Inject 40 Units under the skin into the appropriate area as directed Daily.     • metFORMIN (GLUCOPHAGE) 500 MG tablet Take 1 tablet by mouth 2 (Two) Times a Day With Meals.     • pantoprazole (PROTONIX) 40 MG EC tablet Take 1 tablet by mouth.     • traMADol (ULTRAM) 50 MG tablet Take 1 tablet by mouth Every 6 (Six) Hours As Needed for Moderate Pain.     • valsartan-hydrochlorothiazide (DIOVAN-HCT) 160-25 MG per tablet Take 1 tablet by mouth Daily.       No current facility-administered medications on file prior to visit.       Patient has no known allergies.    Past Medical History:   Diagnosis Date   • Anemia    • Aortic valve replaced    • Chronic pain    • Coronary artery disease    • Degenerative arthritis    • Diabetes     SPOUSE REPORTS SLIGHTLY ELEVATED GLUCOSE AT LAST APPOINTMENT, TAKING NO MEDS   • Dyslipidemia    • Heart attack     2022   • Hyperlipidemia    • Hypertension     Long Standing    • Lower extremity edema     Mild, which is self limiting.    • Murmur, cardiac    • Obesity     s/p lap band surg    • Old myocardial infarction    • Osteoarthritis    • Wears dentures     SPOUSE REPORTS PATIENT WEAR AN UPPER PLATE   • Wears glasses        Social History     Socioeconomic History   • Marital status:    Tobacco Use   • Smoking status: Never   • Smokeless tobacco: Never   Vaping Use   • Vaping status: Never Used   Substance and Sexual Activity   • Alcohol use: No   • Drug use: No   • Sexual activity: Defer       Family History   Problem Relation Age of Onset   • No Known Problems Mother    • Hypertension Father    • Colon cancer Neg Hx        Review of Systems   Constitutional: Negative.  Negative for activity change, appetite change, chills and fever.   HENT: Negative.  Negative for congestion, sinus pressure and sinus pain.    Eyes:  "Negative.  Negative for visual disturbance.   Respiratory:  Negative for apnea, cough, chest tightness, shortness of breath and wheezing.    Cardiovascular: Negative.  Negative for chest pain, palpitations and leg swelling.   Gastrointestinal: Negative.  Negative for blood in stool.   Endocrine: Negative.  Negative for cold intolerance and heat intolerance.   Genitourinary: Negative.  Negative for hematuria.   Musculoskeletal: Negative.  Negative for gait problem.   Skin: Negative.  Negative for color change, rash and wound.   Allergic/Immunologic: Negative.  Negative for environmental allergies and food allergies.   Neurological: Negative.  Negative for dizziness, syncope, weakness, light-headedness, numbness and headaches.   Hematological:  Does not bruise/bleed easily.   Psychiatric/Behavioral: Negative.  Negative for sleep disturbance.        Objective  Vitals:    10/03/24 1325   BP: 139/76   BP Location: Right arm   Patient Position: Sitting   Cuff Size: Adult   Pulse: 65   SpO2: 98%   Weight: 110 kg (242 lb)   Height: 177.8 cm (70\")      /76 (BP Location: Right arm, Patient Position: Sitting, Cuff Size: Adult)   Pulse 65   Ht 177.8 cm (70\")   Wt 110 kg (242 lb)   SpO2 98%   BMI 34.72 kg/m²     Lab Results (most recent)       None            Physical Exam  Vitals and nursing note reviewed.   Constitutional:       General: He is not in acute distress.     Appearance: Normal appearance. He is well-developed.   HENT:      Head: Normocephalic and atraumatic.   Eyes:      General: No scleral icterus.        Right eye: No discharge.         Left eye: No discharge.      Conjunctiva/sclera: Conjunctivae normal.   Neck:      Vascular: No carotid bruit.   Cardiovascular:      Rate and Rhythm: Normal rate and regular rhythm.      Heart sounds: Murmur heard.      Systolic murmur is present with a grade of 1/6.      No friction rub. No gallop.      Comments: Grade 1/6 systolic ejection murmur right upper sternal " border  Pulmonary:      Effort: Pulmonary effort is normal. No respiratory distress.      Breath sounds: Normal breath sounds. No wheezing or rales.   Chest:      Chest wall: No tenderness.   Musculoskeletal:      Right lower leg: No edema.      Left lower leg: No edema.   Skin:     General: Skin is warm and dry.      Coloration: Skin is not pale.      Findings: No erythema or rash.   Neurological:      Mental Status: He is alert and oriented to person, place, and time.      Cranial Nerves: No cranial nerve deficit.   Psychiatric:         Behavior: Behavior normal.         Procedure  Procedures       Assessment & Plan    Problems Addressed this Visit          Cardiac and Vasculature    Aortic valve disease    Primary hypertension    CAD (coronary artery disease) - Primary     Diagnoses         Codes Comments    Coronary artery disease involving native coronary artery of native heart without angina pectoris    -  Primary ICD-10-CM: I25.10  ICD-9-CM: 414.01     Aortic valve disease     ICD-10-CM: I35.9  ICD-9-CM: 424.1     Primary hypertension     ICD-10-CM: I10  ICD-9-CM: 401.9             Recommendations  1.  Patient is a 79-year-old male presenting for routine assessment.  He has known coronary disease with no angina.  He feels remarkably well on antiplatelet and statin therapy.  He is on high intensity statin.  We recommend an LDL goal less than 70 and he has labs performed by primary.  We will monitor for now.    2.  Patient with aortic valve disease with most recent echo showing stable valve parameters.  I will make no changes.    3.  Patient with baseline hypertension doing well on medical therapy.  For now, we will make no changes.    4.  In conclusion, patient feels remarkably well.  He does not have any angina or failure symptoms on appropriate medical therapy.  For now, we will see patient back for follow-up in 6 months or sooner if needed.  Follow-up with primary as scheduled.         Patient did not bring  med list or medicine bottles to appointment, med list has been reviewed and updated based on patient's knowledge of their meds.      Advance Care Planning  ACP discussion was declined by the patient. Patient does not have an advance directive, declines further assistance.      Electronically signed by:

## 2025-05-01 ENCOUNTER — OFFICE VISIT (OUTPATIENT)
Dept: CARDIOLOGY | Facility: CLINIC | Age: 80
End: 2025-05-01
Payer: MEDICARE

## 2025-05-01 VITALS
HEART RATE: 66 BPM | BODY MASS INDEX: 35.5 KG/M2 | WEIGHT: 248 LBS | DIASTOLIC BLOOD PRESSURE: 86 MMHG | HEIGHT: 70 IN | SYSTOLIC BLOOD PRESSURE: 142 MMHG | OXYGEN SATURATION: 98 %

## 2025-05-01 DIAGNOSIS — I48.91 ATRIAL FIBRILLATION, UNSPECIFIED TYPE: ICD-10-CM

## 2025-05-01 DIAGNOSIS — I25.10 CORONARY ARTERY DISEASE INVOLVING NATIVE CORONARY ARTERY OF NATIVE HEART WITHOUT ANGINA PECTORIS: Primary | ICD-10-CM

## 2025-05-01 DIAGNOSIS — I35.9 AORTIC VALVE DISEASE: ICD-10-CM

## 2025-05-01 PROCEDURE — 99213 OFFICE O/P EST LOW 20 MIN: CPT | Performed by: PHYSICIAN ASSISTANT

## 2025-05-01 PROCEDURE — 3079F DIAST BP 80-89 MM HG: CPT | Performed by: PHYSICIAN ASSISTANT

## 2025-05-01 PROCEDURE — 3077F SYST BP >= 140 MM HG: CPT | Performed by: PHYSICIAN ASSISTANT

## 2025-05-01 RX ORDER — LEVOTHYROXINE SODIUM 125 UG/1
TABLET ORAL
COMMUNITY
Start: 2025-04-14

## 2025-05-01 NOTE — LETTER
May 1, 2025     Brian Del Angel MD  [address]  [facility] [address]    Patient: Trevor Jeffers   YOB: 1945   Date of Visit: 5/1/2025       Dear Brian Del Angel MD    Trevor Jeffers was in my office today. Below is a copy of my note.    If you have questions, please do not hesitate to call me. I look forward to following Trevor along with you.         Sincerely,        TEETEE Earl        CC: No Recipients    Problem list     Subjective  Trevor Jeffers is a 79 y.o. male     Chief Complaint   Patient presents with   • 6 month follow up     CAD     Problem list:     1.  Coronary artery disease  1.1 coronary artery bypass grafting July 2022 at [facility] in [place], inadequate data  2.  Aortic stenosis  2.1 moderate aortic stenosis by last evaluation in [place] 2022  2.2 episode of syncope and hospitalization while in [place] with subsequent aortic valve replacement, left atrial appendage clipping and coronary artery bypass grafting performed in July 2022  2.3 echocardiogram October 2022 with stable valve parameters normal systolic function  3.  Hypertension  4.  Dyslipidemia  5.  Insulin-dependent diabetes mellitus  6.  Postoperative atrial fibrillation  6 1 left atrial appendage clipping during coronary bypass grafting.    HPI    Patient is a 79-year-old male presenting to the office for routine assessment.  Clinically, he appears to be doing well.  He does not complain of any chest pain or pressure.  He has no complaints of dyspnea.  No PND or orthopnea.     He does not describe palpitating nor does he complain of dysrhythmic symptoms.  Overall, he appears to be doing well.           Current Outpatient Medications on File Prior to Visit   Medication Sig Dispense Refill   • aspirin 81 MG chewable tablet Chew 1 tablet Daily.     • atorvastatin (LIPITOR) 80 MG tablet Take 1 tablet by mouth every night at bedtime.     • carvedilol (COREG) 25 MG  tablet Take 1 tablet by mouth Daily.     • gabapentin (NEURONTIN) 300 MG capsule Take 1 capsule by mouth 2 (Two) Times a Day.     • Lantus SoloStar 100 UNIT/ML injection pen Inject 40 Units under the skin into the appropriate area as directed Daily.     • levothyroxine (SYNTHROID, LEVOTHROID) 125 MCG tablet      • metFORMIN (GLUCOPHAGE) 500 MG tablet Take 1 tablet by mouth 2 (Two) Times a Day With Meals.     • pantoprazole (PROTONIX) 40 MG EC tablet Take 1 tablet by mouth.     • traMADol (ULTRAM) 50 MG tablet Take 1 tablet by mouth Every 6 (Six) Hours As Needed for Moderate Pain.     • valsartan-hydrochlorothiazide (DIOVAN-HCT) 160-25 MG per tablet Take 1 tablet by mouth Daily.       No current facility-administered medications on file prior to visit.       Patient has no known allergies.    Past Medical History:   Diagnosis Date   • Anemia    • Aortic valve replaced    • Chronic pain    • Coronary artery disease    • Degenerative arthritis    • Diabetes     SPOUSE REPORTS SLIGHTLY ELEVATED GLUCOSE AT LAST APPOINTMENT, TAKING NO MEDS   • Dyslipidemia    • Heart attack     2022   • Hyperlipidemia    • Hypertension     Long Standing    • Lower extremity edema     Mild, which is self limiting.    • Murmur, cardiac    • Obesity     s/p lap band surg    • Old myocardial infarction    • Osteoarthritis    • Wears dentures     SPOUSE REPORTS PATIENT WEAR AN UPPER PLATE   • Wears glasses        Social History     Socioeconomic History   • Marital status:    Tobacco Use   • Smoking status: Never   • Smokeless tobacco: Never   Vaping Use   • Vaping status: Never Used   Substance and Sexual Activity   • Alcohol use: No   • Drug use: No   • Sexual activity: Defer       Family History   Problem Relation Age of Onset   • No Known Problems Mother    • Hypertension Father    • Colon cancer Neg Hx        Review of Systems   Constitutional:  Negative for activity change, appetite change, chills, fatigue and fever.   HENT:  "Negative.  Negative for congestion, sinus pressure and sinus pain.    Eyes: Negative.  Negative for visual disturbance.   Respiratory: Negative.  Negative for apnea, cough, chest tightness, shortness of breath and wheezing.    Cardiovascular: Negative.  Negative for chest pain, palpitations and leg swelling.   Gastrointestinal: Negative.  Negative for blood in stool.   Endocrine: Negative.  Negative for cold intolerance and heat intolerance.   Genitourinary: Negative.  Negative for hematuria.   Musculoskeletal:  Positive for back pain. Negative for gait problem.   Skin: Negative.  Negative for rash and wound.   Allergic/Immunologic: Negative.  Negative for environmental allergies and food allergies.   Neurological:  Positive for dizziness. Negative for syncope, weakness, light-headedness, numbness and headaches.   Hematological: Negative.  Does not bruise/bleed easily.   Psychiatric/Behavioral: Negative.  Negative for sleep disturbance.        Objective  Vitals:    05/01/25 1346   BP: 142/86   BP Location: Right arm   Patient Position: Sitting   Cuff Size: Adult   Pulse: 66   SpO2: 98%   Weight: 112 kg (248 lb)   Height: 177.8 cm (70\")      /86 (BP Location: Right arm, Patient Position: Sitting, Cuff Size: Adult)   Pulse 66   Ht 177.8 cm (70\")   Wt 112 kg (248 lb)   SpO2 98%   BMI 35.58 kg/m²     Lab Results (most recent)       None            Physical Exam  Vitals and nursing note reviewed.   Constitutional:       General: He is not in acute distress.     Appearance: Normal appearance. He is well-developed.   HENT:      Head: Normocephalic and atraumatic.   Eyes:      General: No scleral icterus.        Right eye: No discharge.         Left eye: No discharge.      Conjunctiva/sclera: Conjunctivae normal.   Neck:      Vascular: No carotid bruit.   Cardiovascular:      Rate and Rhythm: Normal rate and regular rhythm.      Heart sounds: Normal heart sounds. No murmur heard.     Systolic murmur is present " with a grade of 1/6.      No friction rub. No gallop.      Comments: Grade 1/6 systolic ejection murmur right upper sternal border  Pulmonary:      Effort: Pulmonary effort is normal. No respiratory distress.      Breath sounds: Normal breath sounds. No wheezing or rales.   Chest:      Chest wall: No tenderness.   Musculoskeletal:      Right lower leg: No edema.      Left lower leg: No edema.   Skin:     General: Skin is warm and dry.      Coloration: Skin is not pale.      Findings: No erythema or rash.   Neurological:      Mental Status: He is alert and oriented to person, place, and time.      Cranial Nerves: No cranial nerve deficit.   Psychiatric:         Behavior: Behavior normal.         Procedure  Procedures       Assessment & Plan    Problems Addressed this Visit          Cardiac and Vasculature    Aortic valve disease    CAD (coronary artery disease) - Primary    Atrial fibrillation     Diagnoses         Codes Comments      Coronary artery disease involving native coronary artery of native heart without angina pectoris    -  Primary ICD-10-CM: I25.10  ICD-9-CM: 414.01       Aortic valve disease     ICD-10-CM: I35.9  ICD-9-CM: 424.1       Atrial fibrillation, unspecified type     ICD-10-CM: I48.91  ICD-9-CM: 427.31           Recommendations  1.  Patient is a 79-year-old male presenting to the office for evaluation with known coronary disease that appears to be doing well.  He does not describe any angina  No current symptoms.  For now, we will continue current medical treatment of antiplatelet therapy and statin therapy.  Patient has labs monitored by primary and we recommend an LDL goal less than 70.    2.  Echocardiogram in 2022 shows stable valve parameters.  Patient has normal systolic function.  Clinically, he appears stable.  We can monitor for now.    3.  Patient with history of postoperative A-fib.  He had left atrial appendage clipping.  For now, he does not complain of any palpitations or  dysrhythmic symptoms.  We can monitor for now.    4.  We can see him back for follow-up in 6 months or sooner if needed.  Follow-up with primary as scheduled.             Trevor Casa Zeyad  reports that he has never smoked. He has never used smokeless tobacco.     Patient did not bring med list or medicine bottles to appointment, med list has been reviewed and updated based on patient's knowledge of their meds.      Advance Care Planning  ACP discussion was declined by the patient. Patient does not have an advance directive, declines further assistance.      Electronically signed by:

## 2025-05-01 NOTE — PROGRESS NOTES
Problem list     Subjective   Trevor Jeffers is a 79 y.o. male     Chief Complaint   Patient presents with    6 month follow up     CAD     Problem list:     1.  Coronary artery disease  1.1 coronary artery bypass grafting July 2022 at [facility] in [place], inadequate data  2.  Aortic stenosis  2.1 moderate aortic stenosis by last evaluation in [place] 2022  2.2 episode of syncope and hospitalization while in [place] with subsequent aortic valve replacement, left atrial appendage clipping and coronary artery bypass grafting performed in July 2022  2.3 echocardiogram October 2022 with stable valve parameters normal systolic function  3.  Hypertension  4.  Dyslipidemia  5.  Insulin-dependent diabetes mellitus  6.  Postoperative atrial fibrillation  6 1 left atrial appendage clipping during coronary bypass grafting.    HPI    Patient is a 79-year-old male presenting to the office for routine assessment.  Clinically, he appears to be doing well.  He does not complain of any chest pain or pressure.  He has no complaints of dyspnea.  No PND or orthopnea.     He does not describe palpitating nor does he complain of dysrhythmic symptoms.  Overall, he appears to be doing well.           Current Outpatient Medications on File Prior to Visit   Medication Sig Dispense Refill    aspirin 81 MG chewable tablet Chew 1 tablet Daily.      atorvastatin (LIPITOR) 80 MG tablet Take 1 tablet by mouth every night at bedtime.      carvedilol (COREG) 25 MG tablet Take 1 tablet by mouth Daily.      gabapentin (NEURONTIN) 300 MG capsule Take 1 capsule by mouth 2 (Two) Times a Day.      Lantus SoloStar 100 UNIT/ML injection pen Inject 40 Units under the skin into the appropriate area as directed Daily.      levothyroxine (SYNTHROID, LEVOTHROID) 125 MCG tablet       metFORMIN (GLUCOPHAGE) 500 MG tablet Take 1 tablet by mouth 2 (Two) Times a Day With Meals.      pantoprazole (PROTONIX) 40 MG EC tablet Take 1 tablet by  mouth.      traMADol (ULTRAM) 50 MG tablet Take 1 tablet by mouth Every 6 (Six) Hours As Needed for Moderate Pain.      valsartan-hydrochlorothiazide (DIOVAN-HCT) 160-25 MG per tablet Take 1 tablet by mouth Daily.       No current facility-administered medications on file prior to visit.       Patient has no known allergies.    Past Medical History:   Diagnosis Date    Anemia     Aortic valve replaced     Chronic pain     Coronary artery disease     Degenerative arthritis     Diabetes     SPOUSE REPORTS SLIGHTLY ELEVATED GLUCOSE AT LAST APPOINTMENT, TAKING NO MEDS    Dyslipidemia     Heart attack     2022    Hyperlipidemia     Hypertension     Long Standing     Lower extremity edema     Mild, which is self limiting.     Murmur, cardiac     Obesity     s/p lap band surg     Old myocardial infarction     Osteoarthritis     Wears dentures     SPOUSE REPORTS PATIENT WEAR AN UPPER PLATE    Wears glasses        Social History     Socioeconomic History    Marital status:    Tobacco Use    Smoking status: Never    Smokeless tobacco: Never   Vaping Use    Vaping status: Never Used   Substance and Sexual Activity    Alcohol use: No    Drug use: No    Sexual activity: Defer       Family History   Problem Relation Age of Onset    No Known Problems Mother     Hypertension Father     Colon cancer Neg Hx        Review of Systems   Constitutional:  Negative for activity change, appetite change, chills, fatigue and fever.   HENT: Negative.  Negative for congestion, sinus pressure and sinus pain.    Eyes: Negative.  Negative for visual disturbance.   Respiratory: Negative.  Negative for apnea, cough, chest tightness, shortness of breath and wheezing.    Cardiovascular: Negative.  Negative for chest pain, palpitations and leg swelling.   Gastrointestinal: Negative.  Negative for blood in stool.   Endocrine: Negative.  Negative for cold intolerance and heat intolerance.   Genitourinary: Negative.  Negative for hematuria.  "  Musculoskeletal:  Positive for back pain. Negative for gait problem.   Skin: Negative.  Negative for rash and wound.   Allergic/Immunologic: Negative.  Negative for environmental allergies and food allergies.   Neurological:  Positive for dizziness. Negative for syncope, weakness, light-headedness, numbness and headaches.   Hematological: Negative.  Does not bruise/bleed easily.   Psychiatric/Behavioral: Negative.  Negative for sleep disturbance.        Objective   Vitals:    05/01/25 1346   BP: 142/86   BP Location: Right arm   Patient Position: Sitting   Cuff Size: Adult   Pulse: 66   SpO2: 98%   Weight: 112 kg (248 lb)   Height: 177.8 cm (70\")      /86 (BP Location: Right arm, Patient Position: Sitting, Cuff Size: Adult)   Pulse 66   Ht 177.8 cm (70\")   Wt 112 kg (248 lb)   SpO2 98%   BMI 35.58 kg/m²     Lab Results (most recent)       None            Physical Exam  Vitals and nursing note reviewed.   Constitutional:       General: He is not in acute distress.     Appearance: Normal appearance. He is well-developed.   HENT:      Head: Normocephalic and atraumatic.   Eyes:      General: No scleral icterus.        Right eye: No discharge.         Left eye: No discharge.      Conjunctiva/sclera: Conjunctivae normal.   Neck:      Vascular: No carotid bruit.   Cardiovascular:      Rate and Rhythm: Normal rate and regular rhythm.      Heart sounds: Normal heart sounds. No murmur heard.     Systolic murmur is present with a grade of 1/6.      No friction rub. No gallop.      Comments: Grade 1/6 systolic ejection murmur right upper sternal border  Pulmonary:      Effort: Pulmonary effort is normal. No respiratory distress.      Breath sounds: Normal breath sounds. No wheezing or rales.   Chest:      Chest wall: No tenderness.   Musculoskeletal:      Right lower leg: No edema.      Left lower leg: No edema.   Skin:     General: Skin is warm and dry.      Coloration: Skin is not pale.      Findings: No " erythema or rash.   Neurological:      Mental Status: He is alert and oriented to person, place, and time.      Cranial Nerves: No cranial nerve deficit.   Psychiatric:         Behavior: Behavior normal.         Procedure   Procedures       Assessment & Plan     Problems Addressed this Visit          Cardiac and Vasculature    Aortic valve disease    CAD (coronary artery disease) - Primary    Atrial fibrillation     Diagnoses         Codes Comments      Coronary artery disease involving native coronary artery of native heart without angina pectoris    -  Primary ICD-10-CM: I25.10  ICD-9-CM: 414.01       Aortic valve disease     ICD-10-CM: I35.9  ICD-9-CM: 424.1       Atrial fibrillation, unspecified type     ICD-10-CM: I48.91  ICD-9-CM: 427.31           Recommendations  1.  Patient is a 79-year-old male presenting to the office for evaluation with known coronary disease that appears to be doing well.  He does not describe any angina  No current symptoms.  For now, we will continue current medical treatment of antiplatelet therapy and statin therapy.  Patient has labs monitored by primary and we recommend an LDL goal less than 70.    2.  Echocardiogram in 2022 shows stable valve parameters.  Patient has normal systolic function.  Clinically, he appears stable.  We can monitor for now.    3.  Patient with history of postoperative A-fib.  He had left atrial appendage clipping.  For now, he does not complain of any palpitations or dysrhythmic symptoms.  We can monitor for now.    4.  We can see him back for follow-up in 6 months or sooner if needed.  Follow-up with primary as scheduled.             Trevor Roquesman  reports that he has never smoked. He has never used smokeless tobacco.     Patient did not bring med list or medicine bottles to appointment, med list has been reviewed and updated based on patient's knowledge of their meds.      Advance Care Planning   ACP discussion was declined by the patient.  Patient does not have an advance directive, declines further assistance.      Electronically signed by:

## (undated) DEVICE — SNAR POLYP SENSATION STDOVL 27 240 BX40

## (undated) DEVICE — TRAP,MUCUS SPECIMEN,40CC: Brand: MEDLINE

## (undated) DEVICE — Device

## (undated) DEVICE — PAD GRND REM POLYHESIVE A/ DISP

## (undated) DEVICE — SYR PREFIL W/SALINE FLSH 10ML

## (undated) DEVICE — ENDOGATOR AUXILIARY WATER JET CONNECTOR: Brand: ENDOGATOR

## (undated) DEVICE — JELLY,LUBE,STERILE,FLIP TOP,TUBE,2-OZ: Brand: MEDLINE

## (undated) DEVICE — FRCP BIOP COLD ENDOJAW ALLGTR W/NDL 2.8X2300MM BLU